# Patient Record
Sex: MALE | Race: WHITE | HISPANIC OR LATINO | URBAN - METROPOLITAN AREA
[De-identification: names, ages, dates, MRNs, and addresses within clinical notes are randomized per-mention and may not be internally consistent; named-entity substitution may affect disease eponyms.]

---

## 2023-11-01 ENCOUNTER — INPATIENT (INPATIENT)
Facility: HOSPITAL | Age: 38
LOS: 5 days | Discharge: HOME | DRG: 603 | End: 2023-11-07
Attending: INTERNAL MEDICINE | Admitting: INTERNAL MEDICINE
Payer: MEDICAID

## 2023-11-01 VITALS
DIASTOLIC BLOOD PRESSURE: 80 MMHG | HEART RATE: 82 BPM | HEIGHT: 62.2 IN | WEIGHT: 171.08 LBS | SYSTOLIC BLOOD PRESSURE: 142 MMHG | OXYGEN SATURATION: 99 % | TEMPERATURE: 99 F | RESPIRATION RATE: 20 BRPM

## 2023-11-01 LAB
ALBUMIN SERPL ELPH-MCNC: 4.1 G/DL — SIGNIFICANT CHANGE UP (ref 3.3–5.2)
ALBUMIN SERPL ELPH-MCNC: 4.1 G/DL — SIGNIFICANT CHANGE UP (ref 3.3–5.2)
ALP SERPL-CCNC: 168 U/L — HIGH (ref 40–120)
ALP SERPL-CCNC: 168 U/L — HIGH (ref 40–120)
ALT FLD-CCNC: 50 U/L — HIGH
ALT FLD-CCNC: 50 U/L — HIGH
ANION GAP SERPL CALC-SCNC: 11 MMOL/L — SIGNIFICANT CHANGE UP (ref 5–17)
ANION GAP SERPL CALC-SCNC: 11 MMOL/L — SIGNIFICANT CHANGE UP (ref 5–17)
AST SERPL-CCNC: 29 U/L — SIGNIFICANT CHANGE UP
AST SERPL-CCNC: 29 U/L — SIGNIFICANT CHANGE UP
BASOPHILS # BLD AUTO: 0.05 K/UL — SIGNIFICANT CHANGE UP (ref 0–0.2)
BASOPHILS # BLD AUTO: 0.05 K/UL — SIGNIFICANT CHANGE UP (ref 0–0.2)
BASOPHILS NFR BLD AUTO: 0.7 % — SIGNIFICANT CHANGE UP (ref 0–2)
BASOPHILS NFR BLD AUTO: 0.7 % — SIGNIFICANT CHANGE UP (ref 0–2)
BILIRUB SERPL-MCNC: 0.3 MG/DL — LOW (ref 0.4–2)
BILIRUB SERPL-MCNC: 0.3 MG/DL — LOW (ref 0.4–2)
BUN SERPL-MCNC: 9.4 MG/DL — SIGNIFICANT CHANGE UP (ref 8–20)
BUN SERPL-MCNC: 9.4 MG/DL — SIGNIFICANT CHANGE UP (ref 8–20)
CALCIUM SERPL-MCNC: 9.3 MG/DL — SIGNIFICANT CHANGE UP (ref 8.4–10.5)
CALCIUM SERPL-MCNC: 9.3 MG/DL — SIGNIFICANT CHANGE UP (ref 8.4–10.5)
CHLORIDE SERPL-SCNC: 99 MMOL/L — SIGNIFICANT CHANGE UP (ref 96–108)
CHLORIDE SERPL-SCNC: 99 MMOL/L — SIGNIFICANT CHANGE UP (ref 96–108)
CO2 SERPL-SCNC: 28 MMOL/L — SIGNIFICANT CHANGE UP (ref 22–29)
CO2 SERPL-SCNC: 28 MMOL/L — SIGNIFICANT CHANGE UP (ref 22–29)
CREAT SERPL-MCNC: 0.61 MG/DL — SIGNIFICANT CHANGE UP (ref 0.5–1.3)
CREAT SERPL-MCNC: 0.61 MG/DL — SIGNIFICANT CHANGE UP (ref 0.5–1.3)
EGFR: 125 ML/MIN/1.73M2 — SIGNIFICANT CHANGE UP
EGFR: 125 ML/MIN/1.73M2 — SIGNIFICANT CHANGE UP
EOSINOPHIL # BLD AUTO: 0.08 K/UL — SIGNIFICANT CHANGE UP (ref 0–0.5)
EOSINOPHIL # BLD AUTO: 0.08 K/UL — SIGNIFICANT CHANGE UP (ref 0–0.5)
EOSINOPHIL NFR BLD AUTO: 1.2 % — SIGNIFICANT CHANGE UP (ref 0–6)
EOSINOPHIL NFR BLD AUTO: 1.2 % — SIGNIFICANT CHANGE UP (ref 0–6)
GLUCOSE SERPL-MCNC: 415 MG/DL — HIGH (ref 70–99)
GLUCOSE SERPL-MCNC: 415 MG/DL — HIGH (ref 70–99)
HCT VFR BLD CALC: 40.3 % — SIGNIFICANT CHANGE UP (ref 39–50)
HCT VFR BLD CALC: 40.3 % — SIGNIFICANT CHANGE UP (ref 39–50)
HGB BLD-MCNC: 14.3 G/DL — SIGNIFICANT CHANGE UP (ref 13–17)
HGB BLD-MCNC: 14.3 G/DL — SIGNIFICANT CHANGE UP (ref 13–17)
IMM GRANULOCYTES NFR BLD AUTO: 0.3 % — SIGNIFICANT CHANGE UP (ref 0–0.9)
IMM GRANULOCYTES NFR BLD AUTO: 0.3 % — SIGNIFICANT CHANGE UP (ref 0–0.9)
LYMPHOCYTES # BLD AUTO: 2.13 K/UL — SIGNIFICANT CHANGE UP (ref 1–3.3)
LYMPHOCYTES # BLD AUTO: 2.13 K/UL — SIGNIFICANT CHANGE UP (ref 1–3.3)
LYMPHOCYTES # BLD AUTO: 31.1 % — SIGNIFICANT CHANGE UP (ref 13–44)
LYMPHOCYTES # BLD AUTO: 31.1 % — SIGNIFICANT CHANGE UP (ref 13–44)
MCHC RBC-ENTMCNC: 31.2 PG — SIGNIFICANT CHANGE UP (ref 27–34)
MCHC RBC-ENTMCNC: 31.2 PG — SIGNIFICANT CHANGE UP (ref 27–34)
MCHC RBC-ENTMCNC: 35.5 GM/DL — SIGNIFICANT CHANGE UP (ref 32–36)
MCHC RBC-ENTMCNC: 35.5 GM/DL — SIGNIFICANT CHANGE UP (ref 32–36)
MCV RBC AUTO: 87.8 FL — SIGNIFICANT CHANGE UP (ref 80–100)
MCV RBC AUTO: 87.8 FL — SIGNIFICANT CHANGE UP (ref 80–100)
MONOCYTES # BLD AUTO: 0.62 K/UL — SIGNIFICANT CHANGE UP (ref 0–0.9)
MONOCYTES # BLD AUTO: 0.62 K/UL — SIGNIFICANT CHANGE UP (ref 0–0.9)
MONOCYTES NFR BLD AUTO: 9.1 % — SIGNIFICANT CHANGE UP (ref 2–14)
MONOCYTES NFR BLD AUTO: 9.1 % — SIGNIFICANT CHANGE UP (ref 2–14)
NEUTROPHILS # BLD AUTO: 3.94 K/UL — SIGNIFICANT CHANGE UP (ref 1.8–7.4)
NEUTROPHILS # BLD AUTO: 3.94 K/UL — SIGNIFICANT CHANGE UP (ref 1.8–7.4)
NEUTROPHILS NFR BLD AUTO: 57.6 % — SIGNIFICANT CHANGE UP (ref 43–77)
NEUTROPHILS NFR BLD AUTO: 57.6 % — SIGNIFICANT CHANGE UP (ref 43–77)
PLATELET # BLD AUTO: 318 K/UL — SIGNIFICANT CHANGE UP (ref 150–400)
PLATELET # BLD AUTO: 318 K/UL — SIGNIFICANT CHANGE UP (ref 150–400)
POTASSIUM SERPL-MCNC: 4.8 MMOL/L — SIGNIFICANT CHANGE UP (ref 3.5–5.3)
POTASSIUM SERPL-MCNC: 4.8 MMOL/L — SIGNIFICANT CHANGE UP (ref 3.5–5.3)
POTASSIUM SERPL-SCNC: 4.8 MMOL/L — SIGNIFICANT CHANGE UP (ref 3.5–5.3)
POTASSIUM SERPL-SCNC: 4.8 MMOL/L — SIGNIFICANT CHANGE UP (ref 3.5–5.3)
PROT SERPL-MCNC: 8.1 G/DL — SIGNIFICANT CHANGE UP (ref 6.6–8.7)
PROT SERPL-MCNC: 8.1 G/DL — SIGNIFICANT CHANGE UP (ref 6.6–8.7)
RBC # BLD: 4.59 M/UL — SIGNIFICANT CHANGE UP (ref 4.2–5.8)
RBC # BLD: 4.59 M/UL — SIGNIFICANT CHANGE UP (ref 4.2–5.8)
RBC # FLD: 11.8 % — SIGNIFICANT CHANGE UP (ref 10.3–14.5)
RBC # FLD: 11.8 % — SIGNIFICANT CHANGE UP (ref 10.3–14.5)
SODIUM SERPL-SCNC: 138 MMOL/L — SIGNIFICANT CHANGE UP (ref 135–145)
SODIUM SERPL-SCNC: 138 MMOL/L — SIGNIFICANT CHANGE UP (ref 135–145)
WBC # BLD: 6.84 K/UL — SIGNIFICANT CHANGE UP (ref 3.8–10.5)
WBC # BLD: 6.84 K/UL — SIGNIFICANT CHANGE UP (ref 3.8–10.5)
WBC # FLD AUTO: 6.84 K/UL — SIGNIFICANT CHANGE UP (ref 3.8–10.5)
WBC # FLD AUTO: 6.84 K/UL — SIGNIFICANT CHANGE UP (ref 3.8–10.5)

## 2023-11-01 PROCEDURE — 99285 EMERGENCY DEPT VISIT HI MDM: CPT

## 2023-11-01 RX ORDER — SODIUM CHLORIDE 9 MG/ML
1000 INJECTION INTRAMUSCULAR; INTRAVENOUS; SUBCUTANEOUS ONCE
Refills: 0 | Status: COMPLETED | OUTPATIENT
Start: 2023-11-01 | End: 2023-11-01

## 2023-11-01 RX ADMIN — SODIUM CHLORIDE 1000 MILLILITER(S): 9 INJECTION INTRAMUSCULAR; INTRAVENOUS; SUBCUTANEOUS at 23:25

## 2023-11-01 NOTE — ED PROVIDER NOTE - PHYSICAL EXAMINATION
Well-nourished well-developed male alert and oriented x3 no acute distress HEENT is significant for approximate 4 to 5 cm mass to the left lateral neck.  Mass is mobile.  There is no fluctuance.  It is not tender to palpation.  Patient has no associated skin signs surrounding the mass.  On examination of the oral cavity patient has poor dentition with multiple cavities that have fillings he is missing some teeth.  There is no obvious signs of infection.

## 2023-11-01 NOTE — ED PROVIDER NOTE - NS ED ATTENDING STATEMENT MOD
This was a shared visit with the DELGADO. I reviewed and verified the documentation and independently performed the documented:

## 2023-11-01 NOTE — ED PROVIDER NOTE - ATTENDING APP SHARED VISIT CONTRIBUTION OF CARE
I, Zainab Caro, performed the initial face to face bedside interview with this patient regarding history of present illness, review of symptoms and relevant past medical, social and family history.  I completed an independent physical examination.  I was the initial provider who evaluated this patient. I have signed out the follow up of any pending tests (i.e. labs, radiological studies) to the ACP.  I have communicated the patient’s plan of care and disposition with the ACP.

## 2023-11-01 NOTE — ED PROVIDER NOTE - PROGRESS NOTE DETAILS
Pt received in s/o from night [facility] pending ENT eval. ENT evaluated pt and recommending medicine admit, iv abx for possible abscess and DM management. A1c found to be 12, started on SSI. Blood cultures drawn. Admit to medicine.

## 2023-11-01 NOTE — ED ADULT NURSE NOTE - OBJECTIVE STATEMENT
pt to ED with complaints of facial swelling.  denies pmh. pt states last week he developed swelling on left side of neck. pt states he had a cough and fever and then noticed his neck was swollen. pt denies pain or difficulty swallowing. pt denies current fevers or cough. pt denies any trauma. RR even and unlabored. pt swallowing secretions without difficulty.

## 2023-11-01 NOTE — ED PROVIDER NOTE - OBJECTIVE STATEMENT
Patient is a 38-year-old male who presents to the emergency department with swelling to his left neck.  Patient denies any trauma.  Patient denies any sore throat.  Patient has had a lump in the left side of his neck for 1 week.  Patient states on the first day of having this he did have fever however the fever has subsequently resolved patient has no other associated symptoms.  Patient has no pets at home and has not gotten scratched by any domesticated or an domesticated animal in the recent past.

## 2023-11-02 DIAGNOSIS — R22.1 LOCALIZED SWELLING, MASS AND LUMP, NECK: ICD-10-CM

## 2023-11-02 LAB
A1C WITH ESTIMATED AVERAGE GLUCOSE RESULT: 12 % — HIGH (ref 4–5.6)
A1C WITH ESTIMATED AVERAGE GLUCOSE RESULT: 12 % — HIGH (ref 4–5.6)
CRP SERPL-MCNC: 8 MG/L — HIGH
CRP SERPL-MCNC: 8 MG/L — HIGH
ERYTHROCYTE [SEDIMENTATION RATE] IN BLOOD: 16 MM/HR — HIGH (ref 0–15)
ERYTHROCYTE [SEDIMENTATION RATE] IN BLOOD: 16 MM/HR — HIGH (ref 0–15)
ESTIMATED AVERAGE GLUCOSE: 298 MG/DL — HIGH (ref 68–114)
ESTIMATED AVERAGE GLUCOSE: 298 MG/DL — HIGH (ref 68–114)
GLUCOSE BLDC GLUCOMTR-MCNC: 139 MG/DL — HIGH (ref 70–99)
GLUCOSE BLDC GLUCOMTR-MCNC: 139 MG/DL — HIGH (ref 70–99)
GLUCOSE BLDC GLUCOMTR-MCNC: 224 MG/DL — HIGH (ref 70–99)
GLUCOSE BLDC GLUCOMTR-MCNC: 224 MG/DL — HIGH (ref 70–99)
GLUCOSE BLDC GLUCOMTR-MCNC: 244 MG/DL — HIGH (ref 70–99)
GLUCOSE BLDC GLUCOMTR-MCNC: 244 MG/DL — HIGH (ref 70–99)
HIV 1 & 2 AB SERPL IA.RAPID: SIGNIFICANT CHANGE UP
HIV 1 & 2 AB SERPL IA.RAPID: SIGNIFICANT CHANGE UP

## 2023-11-02 PROCEDURE — 99222 1ST HOSP IP/OBS MODERATE 55: CPT

## 2023-11-02 PROCEDURE — 99223 1ST HOSP IP/OBS HIGH 75: CPT

## 2023-11-02 RX ORDER — AMPICILLIN SODIUM AND SULBACTAM SODIUM 250; 125 MG/ML; MG/ML
3 INJECTION, POWDER, FOR SUSPENSION INTRAMUSCULAR; INTRAVENOUS EVERY 6 HOURS
Refills: 0 | Status: DISCONTINUED | OUTPATIENT
Start: 2023-11-02 | End: 2023-11-03

## 2023-11-02 RX ORDER — INSULIN LISPRO 100/ML
VIAL (ML) SUBCUTANEOUS
Refills: 0 | Status: DISCONTINUED | OUTPATIENT
Start: 2023-11-02 | End: 2023-11-07

## 2023-11-02 RX ORDER — INSULIN LISPRO 100/ML
4 VIAL (ML) SUBCUTANEOUS
Refills: 0 | Status: DISCONTINUED | OUTPATIENT
Start: 2023-11-02 | End: 2023-11-07

## 2023-11-02 RX ORDER — SODIUM CHLORIDE 9 MG/ML
1000 INJECTION, SOLUTION INTRAVENOUS
Refills: 0 | Status: DISCONTINUED | OUTPATIENT
Start: 2023-11-02 | End: 2023-11-07

## 2023-11-02 RX ORDER — DEXTROSE 50 % IN WATER 50 %
25 SYRINGE (ML) INTRAVENOUS ONCE
Refills: 0 | Status: DISCONTINUED | OUTPATIENT
Start: 2023-11-02 | End: 2023-11-07

## 2023-11-02 RX ORDER — INSULIN LISPRO 100/ML
VIAL (ML) SUBCUTANEOUS AT BEDTIME
Refills: 0 | Status: DISCONTINUED | OUTPATIENT
Start: 2023-11-02 | End: 2023-11-07

## 2023-11-02 RX ORDER — SODIUM CHLORIDE 9 MG/ML
1000 INJECTION INTRAMUSCULAR; INTRAVENOUS; SUBCUTANEOUS ONCE
Refills: 0 | Status: COMPLETED | OUTPATIENT
Start: 2023-11-02 | End: 2023-11-02

## 2023-11-02 RX ORDER — ACETAMINOPHEN 500 MG
650 TABLET ORAL EVERY 6 HOURS
Refills: 0 | Status: DISCONTINUED | OUTPATIENT
Start: 2023-11-02 | End: 2023-11-07

## 2023-11-02 RX ORDER — INSULIN GLARGINE 100 [IU]/ML
16 INJECTION, SOLUTION SUBCUTANEOUS AT BEDTIME
Refills: 0 | Status: DISCONTINUED | OUTPATIENT
Start: 2023-11-02 | End: 2023-11-03

## 2023-11-02 RX ORDER — ONDANSETRON 8 MG/1
4 TABLET, FILM COATED ORAL EVERY 8 HOURS
Refills: 0 | Status: DISCONTINUED | OUTPATIENT
Start: 2023-11-02 | End: 2023-11-07

## 2023-11-02 RX ORDER — AMPICILLIN SODIUM AND SULBACTAM SODIUM 250; 125 MG/ML; MG/ML
INJECTION, POWDER, FOR SUSPENSION INTRAMUSCULAR; INTRAVENOUS
Refills: 0 | Status: DISCONTINUED | OUTPATIENT
Start: 2023-11-02 | End: 2023-11-03

## 2023-11-02 RX ORDER — AMPICILLIN SODIUM AND SULBACTAM SODIUM 250; 125 MG/ML; MG/ML
3 INJECTION, POWDER, FOR SUSPENSION INTRAMUSCULAR; INTRAVENOUS ONCE
Refills: 0 | Status: COMPLETED | OUTPATIENT
Start: 2023-11-02 | End: 2023-11-02

## 2023-11-02 RX ORDER — DEXTROSE 50 % IN WATER 50 %
15 SYRINGE (ML) INTRAVENOUS ONCE
Refills: 0 | Status: DISCONTINUED | OUTPATIENT
Start: 2023-11-02 | End: 2023-11-07

## 2023-11-02 RX ORDER — LANOLIN ALCOHOL/MO/W.PET/CERES
3 CREAM (GRAM) TOPICAL AT BEDTIME
Refills: 0 | Status: DISCONTINUED | OUTPATIENT
Start: 2023-11-02 | End: 2023-11-07

## 2023-11-02 RX ORDER — DEXTROSE 50 % IN WATER 50 %
12.5 SYRINGE (ML) INTRAVENOUS ONCE
Refills: 0 | Status: DISCONTINUED | OUTPATIENT
Start: 2023-11-02 | End: 2023-11-07

## 2023-11-02 RX ORDER — GLUCAGON INJECTION, SOLUTION 0.5 MG/.1ML
1 INJECTION, SOLUTION SUBCUTANEOUS ONCE
Refills: 0 | Status: DISCONTINUED | OUTPATIENT
Start: 2023-11-02 | End: 2023-11-07

## 2023-11-02 RX ADMIN — SODIUM CHLORIDE 1000 MILLILITER(S): 9 INJECTION INTRAMUSCULAR; INTRAVENOUS; SUBCUTANEOUS at 00:30

## 2023-11-02 RX ADMIN — INSULIN GLARGINE 16 UNIT(S): 100 INJECTION, SOLUTION SUBCUTANEOUS at 21:58

## 2023-11-02 RX ADMIN — SODIUM CHLORIDE 1000 MILLILITER(S): 9 INJECTION INTRAMUSCULAR; INTRAVENOUS; SUBCUTANEOUS at 03:54

## 2023-11-02 RX ADMIN — AMPICILLIN SODIUM AND SULBACTAM SODIUM 200 GRAM(S): 250; 125 INJECTION, POWDER, FOR SUSPENSION INTRAMUSCULAR; INTRAVENOUS at 18:21

## 2023-11-02 RX ADMIN — AMPICILLIN SODIUM AND SULBACTAM SODIUM 200 GRAM(S): 250; 125 INJECTION, POWDER, FOR SUSPENSION INTRAMUSCULAR; INTRAVENOUS at 11:34

## 2023-11-02 RX ADMIN — SODIUM CHLORIDE 1000 MILLILITER(S): 9 INJECTION INTRAMUSCULAR; INTRAVENOUS; SUBCUTANEOUS at 04:30

## 2023-11-02 RX ADMIN — Medication 4: at 14:05

## 2023-11-02 RX ADMIN — Medication 4: at 18:21

## 2023-11-02 RX ADMIN — Medication 4 UNIT(S): at 18:22

## 2023-11-02 NOTE — PATIENT PROFILE ADULT - FALL HARM RISK - UNIVERSAL INTERVENTIONS
Bed in lowest position, wheels locked, appropriate side rails in place/Call bell, personal items and telephone in reach/Instruct patient to call for assistance before getting out of bed or chair/Non-slip footwear when patient is out of bed/[place] to call system/Physically safe environment - no spills, clutter or unnecessary equipment/Purposeful Proactive Rounding/Room/bathroom lighting operational, light cord in reach

## 2023-11-02 NOTE — CONSULT NOTE ADULT - SUBJECTIVE AND OBJECTIVE BOX
HPI: 38 year male presented to hospital because of several week history of enlarging neck mass, and incidentally found to have diabetes  REcent polydipsia and polyuria. No piror h/o of diabetes in patient, although there is a family history of diabetes  PMH otherwise negative          MEDICATIONS  (STANDING):  ampicillin/sulbactam  IVPB 3 Gram(s) IV Intermittent every 6 hours  ampicillin/sulbactam  IVPB      dextrose 5%. 1000 milliLiter(s) (100 mL/Hr) IV Continuous <Continuous>  dextrose 5%. 1000 milliLiter(s) (50 mL/Hr) IV Continuous <Continuous>  dextrose 50% Injectable 12.5 Gram(s) IV Push once  dextrose 50% Injectable 25 Gram(s) IV Push once  dextrose 50% Injectable 25 Gram(s) IV Push once  glucagon  Injectable 1 milliGRAM(s) IntraMuscular once  insulin lispro (ADMELOG) corrective regimen sliding scale   SubCutaneous three times a day before meals  insulin lispro (ADMELOG) corrective regimen sliding scale   SubCutaneous at bedtime    MEDICATIONS  (PRN):  acetaminophen     Tablet .. 650 milliGRAM(s) Oral every 6 hours PRN Temp greater or equal to 38C (100.4F), Mild Pain (1 - 3)  aluminum hydroxide/magnesium hydroxide/simethicone Suspension 30 milliLiter(s) Oral every 4 hours PRN Dyspepsia  dextrose Oral Gel 15 Gram(s) Oral once PRN Blood Glucose LESS THAN 70 milliGRAM(s)/deciliter  melatonin 3 milliGRAM(s) Oral at bedtime PRN Insomnia  ondansetron Injectable 4 milliGRAM(s) IV Push every 8 hours PRN Nausea and/or Vomiting      Allergies    No Known Allergies    Intolerances          PHYSICAL EXAM:    Vital Signs Last 24 Hrs  T(C): 36.8 (02 Nov 2023 07:51), Max: 37.2 (01 Nov 2023 18:02)  T(F): 98.2 (02 Nov 2023 07:51), Max: 99 (01 Nov 2023 18:02)  HR: 60 (02 Nov 2023 07:51) (58 - 82)  BP: 112/66 (02 Nov 2023 07:51) (109/66 - 142/80)  BP(mean): --  RR: 18 (02 Nov 2023 07:51) (18 - 20)  SpO2: 98% (02 Nov 2023 07:51) (96% - 99%)    Parameters below as of 01 Nov 2023 18:02  Patient On (Oxygen Delivery Method): room air        General appearance: Well developed, well nourished. NAD    Neck: Trachea midline. No thyroid enlargement. Prominent left neck mass, not tender or fluctuant    Lungs: Normal respiratory excursion. Lungs clear.    CV: Regular cardiac rhythm. No murmur.    Neuro: Cranial nerves intact.     Psych: Normal affect            LABS:                        14.3   6.84  )-----------( 318      ( 01 Nov 2023 20:00 )             40.3     11-01    138  |  99  |  9.4  ----------------------------<  415<H>  4.8   |  28.0  |  0.61    Ca    9.3      01 Nov 2023 20:00    TPro  8.1  /  Alb  4.1  /  TBili  0.3<L>  /  DBili  x   /  AST  29  /  ALT  50<H>  /  AlkPhos  168<H>  11-01    Urinalysis Basic - ( 01 Nov 2023 20:00 )    Color: x / Appearance: x / SG: x / pH: x  Gluc: 415 mg/dL / Ketone: x  / Bili: x / Urobili: x   Blood: x / Protein: x / Nitrite: x   Leuk Esterase: x / RBC: x / WBC x   Sq Epi: x / Non Sq Epi: x / Bacteria: x      LIVER FUNCTIONS - ( 01 Nov 2023 20:00 )  Alb: 4.1 g/dL / Pro: 8.1 g/dL / ALK PHOS: 168 U/L / ALT: 50 U/L / AST: 29 U/L / GGT: x                 POCT Blood Glucose.: 250 mg/dL (11-02-23 @ 10:50)  A1C 12

## 2023-11-02 NOTE — H&P ADULT - NSHPLABSRESULTS_GEN_ALL_CORE
CT Neck w/IV con  IMPRESSION:  5.0 cm cystic-solid mass identified in the deep left neck with regional inflammatory changes described in detail above; diagnostic considerations   include infection (infected branchial cleft cyst, lymphadenitis/scrofula) vs. necrotic node/neoplasm. Tissue sampling recommended.

## 2023-11-02 NOTE — H&P ADULT - HISTORY OF PRESENT ILLNESS
38 year old male with no prior PMH presents from home with 1 week increasing neck mass. States that approx 1 week ago he started noticing some swelling under his jaw after having localized pain there. Reports that i has grown in size since then which is why he decided on coming to the hospital, In the ER imaging done and ENT consulted for a 5 cm mass.    He denies any recent travel history, Born [facility], moved to [place] ~17 years ago), no recent sick contacts (ioncluding TB), no fevers, chills, night sweats or unintentional weight loss. Does repots a mild decrease in appetite for ~ 6 months.  38 year old male with no prior PMH presents from home with 1 week increasing neck mass. States that approx 1 week ago he started noticing some swelling under his jaw after having localized pain there. Reports that i has grown in size since then which is why he decided on coming to the hospital, In the ER imaging done and ENT consulted for a 5 cm mass.    He denies any recent travel history, Born [facility], moved to [place] ~17 years ago), no recent sick contacts (ioncluding TB), no fevers, chills, night sweats or unintentional weight loss. Does repots a mild decrease in appetite for ~ 6 months. No recent dental work  38 year old male with no prior PMH presents from home with 1 week increasing neck mass. States that approx 1 week ago he started noticing some swelling under his jaw after having localized pain there. Reports that i has grown in size since then which is why he decided on coming to the hospital, In the ER imaging done and ENT consulted for a 5 cm mass.    He denies any recent travel history, Born [facility], moved to [place] ~17 years ago), no recent sick contacts (including TB), no fevers, chills, night sweats or unintentional weight loss. Does repots a mild decrease in appetite for ~ 6 months. No recent dental work

## 2023-11-02 NOTE — H&P ADULT - NSHPPHYSICALEXAM_GEN_ALL_CORE
Gen : Non toxic appearing, comfortable, NAD  HEENT : NCAT, MMM, left, non tender firm ~7 cm swelling deep to platysma (no bruit), no JVD  CVS : S1S2, regular rate and rhythm, no murmurs  Resp : CTA b/l, no rhonchi or wheezes appreciated   Abd : Soft, non distended, non tender, BS +ve   MSK : No joint swelling or tenderness, no digital clubbing, no distal cyanosis  Neuro : CN II-XII intact, no focal motor or sensory deficits   Psych : Pleasant, no anxiety, normal affect Gen : Non toxic appearing, comfortable, NAD  HEENT : NCAT, MMM, left, non tender firm ~7 cm swelling deep to platysma (no bruit), no JVD, left molar fillings   CVS : S1S2, regular rate and rhythm, no murmurs  Resp : CTA b/l, no rhonchi or wheezes appreciated   Abd : Soft, non distended, non tender, BS +ve   MSK : No joint swelling or tenderness, no digital clubbing, no distal cyanosis  Neuro : CN II-XII intact, no focal motor or sensory deficits   Psych : Pleasant, no anxiety, normal affect

## 2023-11-02 NOTE — CONSULT NOTE ADULT - ASSESSMENT
38M presenting with L neck mass x 1 week. 38M presenting with L neck mass x 1 week, also found to have uncontrolled diabetes.

## 2023-11-02 NOTE — CONSULT NOTE ADULT - SUBJECTIVE AND OBJECTIVE BOX
CC: L neck mass    HPI: Patient is a 38M with no significant PMH who presents with L neck mass x 1 week. He reports that it started slowly growing, and was accompanied by fevers and tooth pain which has since subsided. He denies SOB, dysphagia, odynophagia or voice changes. He denies sick contacts.      PAST MEDICAL & SURGICAL HISTORY:    Allergies    No Known Allergies    Intolerances      MEDICATIONS  (STANDING):    MEDICATIONS  (PRN):      Social History: **??**    Family history: **??**    ROS:   ENT: all negative except as noted in HPI   CV: denies palpitations  Pulm: denies SOB, cough, hemoptysis  GI: denies change in apetite, indigestion, n/v  : denies pertinent urinary symptoms, urgency  Neuro: denies numbness/tingling, loss of sensation  Psych: denies anxiety  MS: denies muscle weakness, instability  Heme: denies easy bruising or bleeding  Endo: denies heat/cold intolerance, excessive sweating  Vascular: denies LE edema    Vital Signs Last 24 Hrs  T(C): 36.8 (02 Nov 2023 07:51), Max: 37.2 (01 Nov 2023 18:02)  T(F): 98.2 (02 Nov 2023 07:51), Max: 99 (01 Nov 2023 18:02)  HR: 60 (02 Nov 2023 07:51) (58 - 82)  BP: 112/66 (02 Nov 2023 07:51) (109/66 - 142/80)  BP(mean): --  RR: 18 (02 Nov 2023 07:51) (18 - 20)  SpO2: 98% (02 Nov 2023 07:51) (96% - 99%)    Parameters below as of 01 Nov 2023 18:02  Patient On (Oxygen Delivery Method): room air                              14.3   6.84  )-----------( 318      ( 01 Nov 2023 20:00 )             40.3    11-01    138  |  99  |  9.4  ----------------------------<  415<H>  4.8   |  28.0  |  0.61    Ca    9.3      01 Nov 2023 20:00    TPro  8.1  /  Alb  4.1  /  TBili  0.3<L>  /  DBili  x   /  AST  29  /  ALT  50<H>  /  AlkPhos  168<H>  11-01       PHYSICAL EXAM:  Gen: NAD  Skin: No rashes, bruises, or lesions  Head: Normocephalic, Atraumatic  Face: no edema, erythema, or fluctuance. Parotid glands soft without mass  Eyes: no scleral injection  Ears: Right: ear canal clear, TM intact without effusion or erythema. No evidence of any fluid drainage. No mastoid tenderness, erythema, or ear bulging            Left: ear canal clear, TM intact without effusion or erythema. No evidence of any fluid drainage. No mastoid tenderness, erythema, or ear bulging  Nose: Nares bilaterally patent, no discharge  Mouth: No Stridor / Drooling / Trismus.  Mucosa moist, tongue/uvula midline, oropharynx clear  Neck: Flat, supple, no lymphadenopathy, trachea midline, no masses  Lymphatic: No lymphadenopathy  Resp: breathing easily, no stridor  CV: no peripheral edema/cyanosis  GI: nondistended   Peripheral vascular: no JVD or edema  Neuro: facial nerve intact, no facial droop      Diagnostic Nasal Endoscopy: (Scope #2 used)    Fiberoptic Indirect laryngoscopy:  (Scope #2 used)    IMAGING/ADDITIONAL STUDIES:  INCOMPLETE NOTE    CC: L neck mass    HPI: Patient is a 38M with no significant PMH who presents with L neck mass x 1 week. He reports that it started slowly growing, and was accompanied by fevers and tooth pain which has since subsided. He denies SOB, dysphagia, odynophagia or voice changes. He denies sick contacts. No hx of smoking or alcohol use. At bedside, comfortable and in NAD. He denies pain, tenderness or discharge from site. Vitals and WBC WNL. Of note, glucose 415.       PAST MEDICAL & SURGICAL HISTORY:    Allergies    No Known Allergies    Intolerances      MEDICATIONS  (STANDING):    MEDICATIONS  (PRN):      ROS:   ENT: all negative except as noted in HPI   CV: denies palpitations  Pulm: denies SOB, cough, hemoptysis  GI: denies change in apetite, indigestion, n/v  : denies pertinent urinary symptoms, urgency  Neuro: denies numbness/tingling, loss of sensation  Psych: denies anxiety  MS: denies muscle weakness, instability  Heme: denies easy bruising or bleeding  Endo: denies heat/cold intolerance, excessive sweating  Vascular: denies LE edema    Vital Signs Last 24 Hrs  T(C): 36.8 (02 Nov 2023 07:51), Max: 37.2 (01 Nov 2023 18:02)  T(F): 98.2 (02 Nov 2023 07:51), Max: 99 (01 Nov 2023 18:02)  HR: 60 (02 Nov 2023 07:51) (58 - 82)  BP: 112/66 (02 Nov 2023 07:51) (109/66 - 142/80)  BP(mean): --  RR: 18 (02 Nov 2023 07:51) (18 - 20)  SpO2: 98% (02 Nov 2023 07:51) (96% - 99%)    Parameters below as of 01 Nov 2023 18:02  Patient On (Oxygen Delivery Method): room air                              14.3   6.84  )-----------( 318      ( 01 Nov 2023 20:00 )             40.3    11-01    138  |  99  |  9.4  ----------------------------<  415<H>  4.8   |  28.0  |  0.61    Ca    9.3      01 Nov 2023 20:00    TPro  8.1  /  Alb  4.1  /  TBili  0.3<L>  /  DBili  x   /  AST  29  /  ALT  50<H>  /  AlkPhos  168<H>  11-01       PHYSICAL EXAM:  Gen: NAD  Skin: No rashes, bruises, or lesions  Head: Normocephalic, Atraumatic  Face: no edema, erythema, or fluctuance. Parotid glands soft without mass  Eyes: no scleral injection  Ears: external exam unremarkable  Nose: Nares bilaterally patent, no discharge  Mouth: No Stridor / Drooling / Trismus.  Mucosa moist, tongue/uvula midline, oropharynx clear  Neck: firm 5cm palpable and mobile mass, no erythema, fluctuance or discharge noted  Lymphatic: +cervical lymphadenopathy  Resp: breathing easily, no stridor  Neuro: facial nerve intact, no facial droop      IMAGING/ADDITIONAL STUDIES:   ACC: [id number] EXAM:  CT NECK SOFT TISSUE IC   ORDERED BY: LAUREN CASTILLO     PROCEDURE DATE:  11/01/2023          INTERPRETATION:  CT NECK WITH CONTRAST    INDICATION: Left neck mass    TECHNIQUE: Multiple contiguous axial CT images were obtained of the neck   following the administration of 80 cc Omnipaque 350 intravenous contrast.   20 cc contrast discarded. Coronal and sagittal reformatted images were   obtained.    COMPARISON: None    FINDINGS:    NASOPHARYNX / SKULL BASE: There is a cystic-solid mass identified   measuring 3.0 x 3.5 x 5.0 cm extending inferiorly from the   jugulodigastric station along the course of the sternocleidomastoid   muscle, with surrounding inflammatory changes involving the muscle belly,   platysma, and submandibular soft tissues.    SUPRAHYOID NECK: The oropharynx is normal. The oral cavity is normal. The   parotid glands are unremarkable. The submandibular glands are   unremarkable.    INFRAHYOID NECK: The hypopharynx is unremarkable. The larynx and proximal   subglottic airway are unremarkable.    THYROID GLAND: The thyroid is homogenous in appearance without focal   nodule or mass.    LYMPH NODES: Additional reactive appearing cervical lymphadenopathy.    LUNG APICES / VASCULAR STRUCTURES: The imaged portions of the lung apices   are unremarkable. Vascular structures in the neck are normal.    PARANASAL SINUSES: The paranasal sinuses demonstrate left maxillary   atelectasis.    IMAGED SPINE: Imaged spine is normal.    ADDITIONAL OBSERVATIONS: The bones are unremarkable. No intracranial   pathology is evident.      IMPRESSION:  5.0 cm cystic-solid mass identified in the deep left neck with regional   inflammatory changes described in detail above; diagnostic considerations   include infection (infected branchial cleft cyst, lymphadenitis/scrofula)   vs. necrotic node/neoplasm. Tissue sampling recommended.    --- End of Report ---   CC: L neck mass    HPI: Patient is a 38M with no significant PMH who presents with L neck mass x 1 week. He reports that it started slowly growing, and was accompanied by fevers and tooth pain which has since subsided. He denies SOB, dysphagia, odynophagia or voice changes. He denies sick contacts. No hx of smoking or alcohol use. At bedside, comfortable and in NAD. He denies pain, tenderness or discharge from site. Vitals and WBC WNL. Of note, glucose 415.       PAST MEDICAL & SURGICAL HISTORY:    Allergies    No Known Allergies    Intolerances      MEDICATIONS  (STANDING):    MEDICATIONS  (PRN):      ROS:   ENT: all negative except as noted in HPI   CV: denies palpitations  Pulm: denies SOB, cough, hemoptysis  GI: denies change in apetite, indigestion, n/v  : denies pertinent urinary symptoms, urgency  Neuro: denies numbness/tingling, loss of sensation  Psych: denies anxiety  MS: denies muscle weakness, instability  Heme: denies easy bruising or bleeding  Endo: denies heat/cold intolerance, excessive sweating  Vascular: denies LE edema    Vital Signs Last 24 Hrs  T(C): 36.8 (02 Nov 2023 07:51), Max: 37.2 (01 Nov 2023 18:02)  T(F): 98.2 (02 Nov 2023 07:51), Max: 99 (01 Nov 2023 18:02)  HR: 60 (02 Nov 2023 07:51) (58 - 82)  BP: 112/66 (02 Nov 2023 07:51) (109/66 - 142/80)  BP(mean): --  RR: 18 (02 Nov 2023 07:51) (18 - 20)  SpO2: 98% (02 Nov 2023 07:51) (96% - 99%)    Parameters below as of 01 Nov 2023 18:02  Patient On (Oxygen Delivery Method): room air                              14.3   6.84  )-----------( 318      ( 01 Nov 2023 20:00 )             40.3    11-01    138  |  99  |  9.4  ----------------------------<  415<H>  4.8   |  28.0  |  0.61    Ca    9.3      01 Nov 2023 20:00    TPro  8.1  /  Alb  4.1  /  TBili  0.3<L>  /  DBili  x   /  AST  29  /  ALT  50<H>  /  AlkPhos  168<H>  11-01       PHYSICAL EXAM:  Gen: NAD  Skin: No rashes, bruises, or lesions  Head: Normocephalic, Atraumatic  Face: no edema, erythema, or fluctuance. Parotid glands soft without mass  Eyes: no scleral injection  Ears: external exam unremarkable  Nose: Nares bilaterally patent, no discharge  Mouth: No Stridor / Drooling / Trismus.  Mucosa moist, tongue/uvula midline, oropharynx clear  Neck: firm 5cm palpable and mobile mass, no erythema, fluctuance or discharge noted  Lymphatic: +cervical lymphadenopathy  Resp: breathing easily, no stridor  Neuro: facial nerve intact, no facial droop      IMAGING/ADDITIONAL STUDIES:   ACC: [id number] EXAM:  CT NECK SOFT TISSUE IC   ORDERED BY: LAUREN CASTILLO     PROCEDURE DATE:  11/01/2023          INTERPRETATION:  CT NECK WITH CONTRAST    INDICATION: Left neck mass    TECHNIQUE: Multiple contiguous axial CT images were obtained of the neck   following the administration of 80 cc Omnipaque 350 intravenous contrast.   20 cc contrast discarded. Coronal and sagittal reformatted images were   obtained.    COMPARISON: None    FINDINGS:    NASOPHARYNX / SKULL BASE: There is a cystic-solid mass identified   measuring 3.0 x 3.5 x 5.0 cm extending inferiorly from the   jugulodigastric station along the course of the sternocleidomastoid   muscle, with surrounding inflammatory changes involving the muscle belly,   platysma, and submandibular soft tissues.    SUPRAHYOID NECK: The oropharynx is normal. The oral cavity is normal. The   parotid glands are unremarkable. The submandibular glands are   unremarkable.    INFRAHYOID NECK: The hypopharynx is unremarkable. The larynx and proximal   subglottic airway are unremarkable.    THYROID GLAND: The thyroid is homogenous in appearance without focal   nodule or mass.    LYMPH NODES: Additional reactive appearing cervical lymphadenopathy.    LUNG APICES / VASCULAR STRUCTURES: The imaged portions of the lung apices   are unremarkable. Vascular structures in the neck are normal.    PARANASAL SINUSES: The paranasal sinuses demonstrate left maxillary   atelectasis.    IMAGED SPINE: Imaged spine is normal.    ADDITIONAL OBSERVATIONS: The bones are unremarkable. No intracranial   pathology is evident.      IMPRESSION:  5.0 cm cystic-solid mass identified in the deep left neck with regional   inflammatory changes described in detail above; diagnostic considerations   include infection (infected branchial cleft cyst, lymphadenitis/scrofula)   vs. necrotic node/neoplasm. Tissue sampling recommended.    --- End of Report ---    Fiberoptic Indirect Laryngoscopy (Ambu scope used):    Reason for Laryngoscopy:    Patient was unable to cooperate with mirror.  Nasopharynx, oropharynx, and hypopharynx clear, no bleeding. Asymmetric tonsillar tissue, L>R. Tongue base, posterior pharyngeal wall, vallecula, epiglottis, and subglottis appear normal. No erythema, edema, pooling of secretions, masses or lesions. Airway patent, no foreign body visualized. No glottic/supraglottic edema. True vocal cords, arytenoids, vestibular folds, ventricles, pyriform sinuses, and aryepiglottic folds appear normal bilaterally. Vocal cords mobile with good contact b/l.

## 2023-11-02 NOTE — H&P ADULT - ASSESSMENT
38 year old male with no prior known PMH now presents with increasing neck mass x 1 week. Found to be diabetic.     Admit to Medicine to any bed  Enlarging neck mass     38 year old male with no prior known PMH now presenting with increasing neck mass x 1 week. Incidentally found to have uncontrolled DM. Seen by ENT,    Left neck mass   Broad differential   Scrofula vs bacterial abscess, infected lymph node vs   Rate of growth (per history) argues less in favor of being neoplastic however will  lesion less likley  Scrofula, bacterial, 0      Found to be diabetic.     Admit to Medicine to any bed  Enlarging neck mass     38 year old male with no prior known PMH now presenting with increasing neck mass x 1 week. Incidentally found to have uncontrolled DM. Seen by ENT,    Left neck mass   Broad differential   Scrofula vs bacterial abscess vs neoplastic vs infected brachial cleft vs atypical mycobacterial   Seen by ENT.   No warmth / erythema   Started on Unasyn. Now that started can continue   Will need sampling  Blood cultures sent   Send ESR/CRP now  Send mycobacterial culture   ID consulted    Newly diagnosed DM   A1C 12  Appreciate Endo eval  Started on Lantus 16 unts QHS and Lispro premeal 4 units tid     DVT ppx : Heparin s/c    38 year old male with no prior known PMH now presenting with increasing neck mass x 1 week. Incidentally found to have uncontrolled DM. Seen by ENT,    Left neck mass   Broad differential   Scrofula vs bacterial abscess vs neoplastic vs infected brachial cleft vs atypical mycobacterial   Seen by ENT.   No warmth / erythema   Started on Unasyn. Now that started can continue   Will need sampling  Blood cultures sent   Send ESR/CRP now  Send mycobacterial culture   Send Quant  ID consulted    Newly diagnosed DM   A1C 12  Appreciate Endo eval  Started on Lantus 16 unts QHS and Lispro premeal 4 units tid     DVT ppx : Heparin s/c    38 year old male with no prior known PMH now presenting with increasing neck mass x 1 week. Incidentally found to have uncontrolled DM. Seen by ENT,    Left neck mass   Broad differential   Bacterial abscess vs neoplastic vs infected brachial cleft vs atypical mycobacterial vs Scrofula vs  Seen by ENT.   No warmth / erythema   Started on Unasyn. Will awiat ID eval prior to determining if should continue vs stop (given lack of systenmc s;/s of infection and chances of affecting culture sampling)  Images reviewed with IR. Potential biopsy tomorrow. Will not need anesthesia, can continue PO   Will need sampling  Blood cultures sent   Send ESR/CRP now  Send mycobacterial culture   Send Quant  ID consulted    Newly diagnosed DM   A1C 12  Appreciate Endo eval  Started on Lantus 16 unts QHS and Lispro premeal 4 units tid     DVT ppx : Heparin s/c    38 year old male with no prior known PMH now presenting with increasing neck mass x 1 week. Incidentally found to have uncontrolled DM. Seen by ENT,    Left neck mass   Broad differential   Bacterial abscess vs neoplastic vs infected brachial cleft vs atypical mycobacterial vs Scrofula vs  Seen by ENT.   No warmth / erythema   Started on Unasyn. Will awiat ID eval prior to determining if should continue vs stop (given lack of systenmc s;/s of infection and chances of affecting culture sampling)  Images reviewed with IR. Potential biopsy tomorrow. Will not need anesthesia, can continue PO   Will need sampling  Blood cultures sent   Send ESR/CRP now  Send mycobacterial culture   Send Quant  ID consulted    Newly diagnosed DM   A1C 12  Appreciate Endo eval  Started on Lantus 16 unts QHS and Lispro premeal 4 units tid     DVT ppx : SCDs

## 2023-11-02 NOTE — ADVANCED PRACTICE NURSE CONSULT - ASSESSMENT
went to see pt and family in am pt is a+ox3 co 0 pain family at bedside providing comfort. he did not know that he has diabetes. pt was educated about the elevated bg and the increased risk of infection. he was educated in [facility] about the importance of using insulin at this time. he was educated about the type of insulin he is on written material in Faroese provided. he was educated about the use of insulin pen written material about insulin pen provided. pt and family were educated about healty food choices the plate method was used to teach pprtions. written material provided. pt and family were educated about ss of hypoglycemia and treatment written material provided.pt was invited to [facility] diabetes club on november 5 2023 yearly calendar provided

## 2023-11-02 NOTE — CONSULT NOTE ADULT - SUBJECTIVE AND OBJECTIVE BOX
INFECTIOUS DISEASES AND INTERNAL MEDICINE at [facility]  =======================================================  Zelalem Almodovar MD  Diplomates American Board of Internal Medicine and Infectious Diseases  Telephone [phone]  Fax            [phone]  =======================================================    JOELLE [id number]      HPI:  38 year old male with no prior PMH presents from home with 1 week increasing neck mass. States that approx 1 week ago he started noticing some swelling under his jaw after having localized pain there. Reports that i has grown in size since then which is why he decided on coming to the hospital, In the ER imaging done and ENT consulted for a 5 cm mass.    He denies any recent travel history, Born [facility], moved to [place] ago), no recent sick contacts (including TB), no fevers, chills, night sweats or unintentional weight loss. Does repots a mild decrease in appetite for ~ 6 months. No recent dental work     AS ABOVE WITH NECK MASS NON TOXIC  AFEBRILE CT SCAN ? INFECTIOUS PROCESS  ASKED TO EVALUATE FROM ID STANDPOINT       PAST MEDICAL & SURGICAL HISTORY:      ANTIBIOTICS  ampicillin/sulbactam  IVPB 3 Gram(s) IV Intermittent every 6 hours  ampicillin/sulbactam  IVPB          Allergies    No Known Allergies    Intolerances        SOCIAL HISTORY:     FAMILY HX   FAMILY HISTORY:      Vital Signs Last 24 Hrs  T(C): 36.6 (02 Nov 2023 15:36), Max: 37.2 (01 Nov 2023 18:02)  T(F): 97.8 (02 Nov 2023 15:36), Max: 99 (01 Nov 2023 18:02)  HR: 74 (02 Nov 2023 15:36) (58 - 82)  BP: 121/71 (02 Nov 2023 15:36) (109/66 - 142/80)  BP(mean): --  RR: 18 (02 Nov 2023 15:36) (18 - 20)  SpO2: 94% (02 Nov 2023 15:36) (94% - 99%)    Parameters below as of 02 Nov 2023 15:36  Patient On (Oxygen Delivery Method): room air      Drug Dosing Weight  Height (cm): 158 (01 Nov 2023 18:02)  Weight (kg): 77.6 (01 Nov 2023 18:02)  BMI (kg/m2): 31.1 (01 Nov 2023 18:02)  BSA (m2): 1.79 (01 Nov 2023 18:02)      REVIEW OF SYSTEMS:    CONSTITUTIONAL:  As per HPI.    HEENT:  Eyes:  No diplopia or blurred vision. ENT:  No earache, sore throat or runny nose.    CARDIOVASCULAR:  No pressure, squeezing, strangling, tightness, heaviness or aching about the chest, neck, axilla or epigastrium.    RESPIRATORY:  No cough, shortness of breath, PND or orthopnea.    GASTROINTESTINAL:  No nausea, vomiting or diarrhea.    GENITOURINARY:  No dysuria, frequency or urgency.    MUSCULOSKELETAL:  As per HPI.    SKIN:  No change in skin, hair or nails.    NEUROLOGIC:  No paresthesias, fasciculations, seizures or weakness.                  PHYSICAL EXAMINATION:    GENERAL: The patient is a _____in no apparent distress. ___     VITAL SIGNS: T(C): 36.6 (11-02-23 @ 15:36), Max: 37.2 (11-01-23 @ 18:02)  HR: 74 (11-02-23 @ 15:36) (58 - 82)  BP: 121/71 (11-02-23 @ 15:36) (109/66 - 142/80)  RR: 18 (11-02-23 @ 15:36) (18 - 20)  SpO2: 94% (11-02-23 @ 15:36) (94% - 99%)  Wt(kg): --    HEENT: Head is normocephalic and atraumatic.  ANICTERIC  NECK: Supple. No carotid bruits.   LEFT SIDED AREAOF NECK SWELLING SUBMANDIBULAR  AREA MOBILE    LUNGS:COARSE BREATH SOUNDS    HEART: Regular rate and rhythm without murmur.    ABDOMEN: Soft, nontender, and nondistended.  Positive bowel sounds.  No hepatosplenomegaly was noted. NO REBOUND NO GUARDING    EXTREMITIES: NO EDEMA NO ERYTHEMA    NEUROLOGIC: NON FOCAL      SKIN: No ulceration or induration present. NO RASH        BLOOD CULTURES       URINE CX          LABS:                        14.3   6.84  )-----------( 318      ( 01 Nov 2023 20:00 )             40.3     11-01    138  |  99  |  9.4  ----------------------------<  415<H>  4.8   |  28.0  |  0.61    Ca    9.3      01 Nov 2023 20:00    TPro  8.1  /  Alb  4.1  /  TBili  0.3<L>  /  DBili  x   /  AST  29  /  ALT  50<H>  /  AlkPhos  168<H>  11-01      Urinalysis Basic - ( 01 Nov 2023 20:00 )    Color: x / Appearance: x / SG: x / pH: x  Gluc: 415 mg/dL / Ketone: x  / Bili: x / Urobili: x   Blood: x / Protein: x / Nitrite: x   Leuk Esterase: x / RBC: x / WBC x   Sq Epi: x / Non Sq Epi: x / Bacteria: x        RADIOLOGY & ADDITIONAL STUDIES:      ASSESSMENT/PLAN  38 year old male with no prior PMH presents from home with 1 week increasing neck mass. States that approx 1 week ago he started noticing some swelling under his jaw after having localized pain there. Reports that i has grown in size since then which is why he decided on coming to the hospital, In the ER imaging done and ENT consulted for a 5 cm mass.    He denies any recent travel history, Born [facility], [place] to [place] ago), no recent sick contacts (including TB), no fevers, chills, night sweats or unintentional weight loss. Does repots a mild decrease in appetite for ~ 6 months. No recent dental work     AS ABOVE WITH NECK MASS NON TOXIC  AFEBRILE CT SCAN ? INFECTIOUS PROCESS  DENIES ILL HOUSHOLD CONTACTS NO PETS AT HOME NO RECENT TRAVEL  REIVEWED CT SCAN WITH RADIOLOGY  IR TO  TRY TO DO ASPIRATE FOR CX GM STAIN  FUNGAL AND AFB  DOUBT TB BUT CAN DO QUANTERFERON   DEFER ABX FOR NOW UNTIL AFTER ASPIRATION  DOES  NOT APPEAR TO BE ODONTOGENIC  WILL FOLLOWUP   SPOKE TO HOSPITALIST                   LAMBERTO PINEDA MD

## 2023-11-02 NOTE — CONSULT NOTE ADULT - ASSESSMENT
DM type 2, newly diagnosed, uncontrolled  Possible neck abscess, ?scrofula    Initiation of insulin advisable for prompt improvement in hyperglycemia given possible abscess

## 2023-11-02 NOTE — ED ADULT NURSE REASSESSMENT NOTE - NS ED NURSE REASSESS COMMENT FT1
Assumed care of pt at 07:15 as stated in report from RN MICHEAL. Charting as noted. Patient A&O x4, denies pain/discomfort, denies CP/SOB. Updated on the plan of care. Call bell within reach, bed locked in lowest position. IV site flushed w/ NS. No redness, swelling or pain noted to site. No signs of acute distress noted, safety maintained.

## 2023-11-02 NOTE — CONSULT NOTE ADULT - PROBLEM SELECTOR RECOMMENDATION 9
- No acute surgical intervention at this time. Based on history, physical exam and newly diagnosed diabetes, mass most likely correlated with abscess though mass not completely ruled out. Recommend starting IV unasyn and admit to medicine for further glycemic control  - If no resolution of change in mass size by tomorrow after multiple rounds of IV antibiotics, will consider doing biopsy in house  - ENT will continue to follow, please call [phone] for further questions.    Plan discussed with Dr. Jalloh. - No acute surgical intervention at this time. Based on history, physical exam and newly diagnosed diabetes, mass most likely correlated with abscess though mass not completely ruled out. Recommend starting IV unasyn and admit to medicine for further glycemic control  - F/u blood cultures  - If no resolution of change in mass size by tomorrow after multiple rounds of IV antibiotics, will consider doing biopsy in house  - ENT will continue to follow, please call [phone] for further questions.    Plan discussed with Dr. Jalloh.

## 2023-11-03 LAB
ANION GAP SERPL CALC-SCNC: 13 MMOL/L — SIGNIFICANT CHANGE UP (ref 5–17)
ANION GAP SERPL CALC-SCNC: 13 MMOL/L — SIGNIFICANT CHANGE UP (ref 5–17)
BASOPHILS # BLD AUTO: 0.05 K/UL — SIGNIFICANT CHANGE UP (ref 0–0.2)
BASOPHILS # BLD AUTO: 0.05 K/UL — SIGNIFICANT CHANGE UP (ref 0–0.2)
BASOPHILS NFR BLD AUTO: 0.5 % — SIGNIFICANT CHANGE UP (ref 0–2)
BASOPHILS NFR BLD AUTO: 0.5 % — SIGNIFICANT CHANGE UP (ref 0–2)
BUN SERPL-MCNC: 15.5 MG/DL — SIGNIFICANT CHANGE UP (ref 8–20)
BUN SERPL-MCNC: 15.5 MG/DL — SIGNIFICANT CHANGE UP (ref 8–20)
CALCIUM SERPL-MCNC: 8.8 MG/DL — SIGNIFICANT CHANGE UP (ref 8.4–10.5)
CALCIUM SERPL-MCNC: 8.8 MG/DL — SIGNIFICANT CHANGE UP (ref 8.4–10.5)
CHLORIDE SERPL-SCNC: 102 MMOL/L — SIGNIFICANT CHANGE UP (ref 96–108)
CHLORIDE SERPL-SCNC: 102 MMOL/L — SIGNIFICANT CHANGE UP (ref 96–108)
CO2 SERPL-SCNC: 26 MMOL/L — SIGNIFICANT CHANGE UP (ref 22–29)
CO2 SERPL-SCNC: 26 MMOL/L — SIGNIFICANT CHANGE UP (ref 22–29)
CREAT SERPL-MCNC: 0.57 MG/DL — SIGNIFICANT CHANGE UP (ref 0.5–1.3)
CREAT SERPL-MCNC: 0.57 MG/DL — SIGNIFICANT CHANGE UP (ref 0.5–1.3)
CRP SERPL-MCNC: 6 MG/L — HIGH
CRP SERPL-MCNC: 6 MG/L — HIGH
EGFR: 127 ML/MIN/1.73M2 — SIGNIFICANT CHANGE UP
EGFR: 127 ML/MIN/1.73M2 — SIGNIFICANT CHANGE UP
EOSINOPHIL # BLD AUTO: 0.1 K/UL — SIGNIFICANT CHANGE UP (ref 0–0.5)
EOSINOPHIL # BLD AUTO: 0.1 K/UL — SIGNIFICANT CHANGE UP (ref 0–0.5)
EOSINOPHIL NFR BLD AUTO: 1.1 % — SIGNIFICANT CHANGE UP (ref 0–6)
EOSINOPHIL NFR BLD AUTO: 1.1 % — SIGNIFICANT CHANGE UP (ref 0–6)
ERYTHROCYTE [SEDIMENTATION RATE] IN BLOOD: 14 MM/HR — SIGNIFICANT CHANGE UP (ref 0–15)
ERYTHROCYTE [SEDIMENTATION RATE] IN BLOOD: 14 MM/HR — SIGNIFICANT CHANGE UP (ref 0–15)
GLUCOSE BLDC GLUCOMTR-MCNC: 102 MG/DL — HIGH (ref 70–99)
GLUCOSE BLDC GLUCOMTR-MCNC: 102 MG/DL — HIGH (ref 70–99)
GLUCOSE BLDC GLUCOMTR-MCNC: 144 MG/DL — HIGH (ref 70–99)
GLUCOSE BLDC GLUCOMTR-MCNC: 144 MG/DL — HIGH (ref 70–99)
GLUCOSE BLDC GLUCOMTR-MCNC: 221 MG/DL — HIGH (ref 70–99)
GLUCOSE BLDC GLUCOMTR-MCNC: 221 MG/DL — HIGH (ref 70–99)
GLUCOSE SERPL-MCNC: 192 MG/DL — HIGH (ref 70–99)
GLUCOSE SERPL-MCNC: 192 MG/DL — HIGH (ref 70–99)
GRAM STN FLD: SIGNIFICANT CHANGE UP
GRAM STN FLD: SIGNIFICANT CHANGE UP
HCT VFR BLD CALC: 39.1 % — SIGNIFICANT CHANGE UP (ref 39–50)
HCT VFR BLD CALC: 39.1 % — SIGNIFICANT CHANGE UP (ref 39–50)
HGB BLD-MCNC: 13.2 G/DL — SIGNIFICANT CHANGE UP (ref 13–17)
HGB BLD-MCNC: 13.2 G/DL — SIGNIFICANT CHANGE UP (ref 13–17)
IMM GRANULOCYTES NFR BLD AUTO: 0.3 % — SIGNIFICANT CHANGE UP (ref 0–0.9)
IMM GRANULOCYTES NFR BLD AUTO: 0.3 % — SIGNIFICANT CHANGE UP (ref 0–0.9)
LYMPHOCYTES # BLD AUTO: 2.92 K/UL — SIGNIFICANT CHANGE UP (ref 1–3.3)
LYMPHOCYTES # BLD AUTO: 2.92 K/UL — SIGNIFICANT CHANGE UP (ref 1–3.3)
LYMPHOCYTES # BLD AUTO: 30.7 % — SIGNIFICANT CHANGE UP (ref 13–44)
LYMPHOCYTES # BLD AUTO: 30.7 % — SIGNIFICANT CHANGE UP (ref 13–44)
MAGNESIUM SERPL-MCNC: 2 MG/DL — SIGNIFICANT CHANGE UP (ref 1.6–2.6)
MAGNESIUM SERPL-MCNC: 2 MG/DL — SIGNIFICANT CHANGE UP (ref 1.6–2.6)
MCHC RBC-ENTMCNC: 30.1 PG — SIGNIFICANT CHANGE UP (ref 27–34)
MCHC RBC-ENTMCNC: 30.1 PG — SIGNIFICANT CHANGE UP (ref 27–34)
MCHC RBC-ENTMCNC: 33.8 GM/DL — SIGNIFICANT CHANGE UP (ref 32–36)
MCHC RBC-ENTMCNC: 33.8 GM/DL — SIGNIFICANT CHANGE UP (ref 32–36)
MCV RBC AUTO: 89.3 FL — SIGNIFICANT CHANGE UP (ref 80–100)
MCV RBC AUTO: 89.3 FL — SIGNIFICANT CHANGE UP (ref 80–100)
MONOCYTES # BLD AUTO: 0.8 K/UL — SIGNIFICANT CHANGE UP (ref 0–0.9)
MONOCYTES # BLD AUTO: 0.8 K/UL — SIGNIFICANT CHANGE UP (ref 0–0.9)
MONOCYTES NFR BLD AUTO: 8.4 % — SIGNIFICANT CHANGE UP (ref 2–14)
MONOCYTES NFR BLD AUTO: 8.4 % — SIGNIFICANT CHANGE UP (ref 2–14)
NEUTROPHILS # BLD AUTO: 5.61 K/UL — SIGNIFICANT CHANGE UP (ref 1.8–7.4)
NEUTROPHILS # BLD AUTO: 5.61 K/UL — SIGNIFICANT CHANGE UP (ref 1.8–7.4)
NEUTROPHILS NFR BLD AUTO: 59 % — SIGNIFICANT CHANGE UP (ref 43–77)
NEUTROPHILS NFR BLD AUTO: 59 % — SIGNIFICANT CHANGE UP (ref 43–77)
NIGHT BLUE STAIN TISS: SIGNIFICANT CHANGE UP
PLATELET # BLD AUTO: 312 K/UL — SIGNIFICANT CHANGE UP (ref 150–400)
PLATELET # BLD AUTO: 312 K/UL — SIGNIFICANT CHANGE UP (ref 150–400)
POTASSIUM SERPL-MCNC: 3.6 MMOL/L — SIGNIFICANT CHANGE UP (ref 3.5–5.3)
POTASSIUM SERPL-MCNC: 3.6 MMOL/L — SIGNIFICANT CHANGE UP (ref 3.5–5.3)
POTASSIUM SERPL-SCNC: 3.6 MMOL/L — SIGNIFICANT CHANGE UP (ref 3.5–5.3)
POTASSIUM SERPL-SCNC: 3.6 MMOL/L — SIGNIFICANT CHANGE UP (ref 3.5–5.3)
RBC # BLD: 4.38 M/UL — SIGNIFICANT CHANGE UP (ref 4.2–5.8)
RBC # BLD: 4.38 M/UL — SIGNIFICANT CHANGE UP (ref 4.2–5.8)
RBC # FLD: 11.8 % — SIGNIFICANT CHANGE UP (ref 10.3–14.5)
RBC # FLD: 11.8 % — SIGNIFICANT CHANGE UP (ref 10.3–14.5)
SODIUM SERPL-SCNC: 140 MMOL/L — SIGNIFICANT CHANGE UP (ref 135–145)
SODIUM SERPL-SCNC: 140 MMOL/L — SIGNIFICANT CHANGE UP (ref 135–145)
SPECIMEN SOURCE: SIGNIFICANT CHANGE UP
WBC # BLD: 9.51 K/UL — SIGNIFICANT CHANGE UP (ref 3.8–10.5)
WBC # BLD: 9.51 K/UL — SIGNIFICANT CHANGE UP (ref 3.8–10.5)
WBC # FLD AUTO: 9.51 K/UL — SIGNIFICANT CHANGE UP (ref 3.8–10.5)
WBC # FLD AUTO: 9.51 K/UL — SIGNIFICANT CHANGE UP (ref 3.8–10.5)

## 2023-11-03 PROCEDURE — 10006 FNA BX W/US GDN EA ADDL: CPT

## 2023-11-03 PROCEDURE — 99232 SBSQ HOSP IP/OBS MODERATE 35: CPT

## 2023-11-03 PROCEDURE — 71045 X-RAY EXAM CHEST 1 VIEW: CPT | Mod: 26

## 2023-11-03 PROCEDURE — 99233 SBSQ HOSP IP/OBS HIGH 50: CPT

## 2023-11-03 PROCEDURE — 88173 CYTOPATH EVAL FNA REPORT: CPT | Mod: 26

## 2023-11-03 PROCEDURE — 10005 FNA BX W/US GDN 1ST LES: CPT

## 2023-11-03 PROCEDURE — 88189 FLOWCYTOMETRY/READ 16 & >: CPT

## 2023-11-03 PROCEDURE — 71250 CT THORAX DX C-: CPT | Mod: 26

## 2023-11-03 RX ORDER — INSULIN GLARGINE 100 [IU]/ML
20 INJECTION, SOLUTION SUBCUTANEOUS AT BEDTIME
Refills: 0 | Status: DISCONTINUED | OUTPATIENT
Start: 2023-11-03 | End: 2023-11-06

## 2023-11-03 RX ORDER — AMPICILLIN SODIUM AND SULBACTAM SODIUM 250; 125 MG/ML; MG/ML
3 INJECTION, POWDER, FOR SUSPENSION INTRAMUSCULAR; INTRAVENOUS EVERY 6 HOURS
Refills: 0 | Status: DISCONTINUED | OUTPATIENT
Start: 2023-11-03 | End: 2023-11-07

## 2023-11-03 RX ORDER — AMPICILLIN SODIUM AND SULBACTAM SODIUM 250; 125 MG/ML; MG/ML
3 INJECTION, POWDER, FOR SUSPENSION INTRAMUSCULAR; INTRAVENOUS ONCE
Refills: 0 | Status: COMPLETED | OUTPATIENT
Start: 2023-11-03 | End: 2023-11-03

## 2023-11-03 RX ORDER — AMPICILLIN SODIUM AND SULBACTAM SODIUM 250; 125 MG/ML; MG/ML
INJECTION, POWDER, FOR SUSPENSION INTRAMUSCULAR; INTRAVENOUS
Refills: 0 | Status: DISCONTINUED | OUTPATIENT
Start: 2023-11-03 | End: 2023-11-07

## 2023-11-03 RX ADMIN — Medication 650 MILLIGRAM(S): at 17:54

## 2023-11-03 RX ADMIN — Medication 4: at 09:37

## 2023-11-03 RX ADMIN — AMPICILLIN SODIUM AND SULBACTAM SODIUM 200 GRAM(S): 250; 125 INJECTION, POWDER, FOR SUSPENSION INTRAMUSCULAR; INTRAVENOUS at 22:38

## 2023-11-03 RX ADMIN — AMPICILLIN SODIUM AND SULBACTAM SODIUM 200 GRAM(S): 250; 125 INJECTION, POWDER, FOR SUSPENSION INTRAMUSCULAR; INTRAVENOUS at 17:55

## 2023-11-03 RX ADMIN — Medication 650 MILLIGRAM(S): at 18:24

## 2023-11-03 RX ADMIN — Medication 4 UNIT(S): at 12:00

## 2023-11-03 RX ADMIN — Medication 4 UNIT(S): at 09:38

## 2023-11-03 RX ADMIN — Medication 4 UNIT(S): at 17:55

## 2023-11-03 RX ADMIN — Medication 2: at 12:00

## 2023-11-03 RX ADMIN — INSULIN GLARGINE 20 UNIT(S): 100 INJECTION, SOLUTION SUBCUTANEOUS at 22:38

## 2023-11-03 RX ADMIN — AMPICILLIN SODIUM AND SULBACTAM SODIUM 200 GRAM(S): 250; 125 INJECTION, POWDER, FOR SUSPENSION INTRAMUSCULAR; INTRAVENOUS at 00:07

## 2023-11-03 RX ADMIN — AMPICILLIN SODIUM AND SULBACTAM SODIUM 200 GRAM(S): 250; 125 INJECTION, POWDER, FOR SUSPENSION INTRAMUSCULAR; INTRAVENOUS at 11:55

## 2023-11-03 NOTE — PROGRESS NOTE ADULT - ASSESSMENT
38M with no PMHx presented to hospital because of several week history of enlarging neck mass, incidentally found to have diabetes. Endorses recent polydipsia and polyuria.    1. New onset diabetes, uncontrolled - a1c 12%  - Increase lantus to 20 units qhs  - Contine admelog 4 units tid  - Sliding scale coverage  - Needs diabetes and insulin education, meter teach    2. Neck Mass  - S/p IR needle aspiration  - ID following

## 2023-11-03 NOTE — PROGRESS NOTE ADULT - SUBJECTIVE AND OBJECTIVE BOX
INFECTIOUS DISEASES AND INTERNAL MEDICINE at [facility]  =======================================================  Zelalem Almodovar MD  Diplomates American Board of Internal Medicine and Infectious Diseases  Telephone [phone]  Fax            [phone]  =======================================================    MUMTAZTIFFANYGREGORYJABIERS [id number]    Follow up: NECK ABSCESS    Allergies:  No Known Allergies      Medications:  acetaminophen     Tablet .. 650 milliGRAM(s) Oral every 6 hours PRN  aluminum hydroxide/magnesium hydroxide/simethicone Suspension 30 milliLiter(s) Oral every 4 hours PRN  ampicillin/sulbactam  IVPB      dextrose 5%. 1000 milliLiter(s) IV Continuous <Continuous>  dextrose 5%. 1000 milliLiter(s) IV Continuous <Continuous>  dextrose 50% Injectable 12.5 Gram(s) IV Push once  dextrose 50% Injectable 25 Gram(s) IV Push once  dextrose 50% Injectable 25 Gram(s) IV Push once  dextrose Oral Gel 15 Gram(s) Oral once PRN  glucagon  Injectable 1 milliGRAM(s) IntraMuscular once  insulin glargine Injectable (LANTUS) 20 Unit(s) SubCutaneous at bedtime  insulin lispro (ADMELOG) corrective regimen sliding scale   SubCutaneous at bedtime  insulin lispro (ADMELOG) corrective regimen sliding scale   SubCutaneous three times a day before meals  insulin lispro Injectable (ADMELOG) 4 Unit(s) SubCutaneous three times a day before meals  melatonin 3 milliGRAM(s) Oral at bedtime PRN  ondansetron Injectable 4 milliGRAM(s) IV Push every 8 hours PRN    SOCIAL       FAMILY   FAMILY HISTORY:    REVIEW OF SYSTEMS:  CONSTITUTIONAL:  No Fever or chills  HEENT:   No diplopia or blurred vision.  No earache, sore throat or runny nose.  CARDIOVASCULAR:  No pressure, squeezing, strangling, tightness, heaviness or aching about the chest, neck, axilla or epigastrium.  RESPIRATORY:  No cough, shortness of breath, PND or orthopnea.  GASTROINTESTINAL:  No nausea, vomiting or diarrhea.  GENITOURINARY:  No dysuria, frequency or urgency. No Blood in urine  MUSCULOSKELETAL:   moves all joints  SKIN:  No change in skin, hair or nails.  NEUROLOGIC:  No paresthesias, fasciculations, seizures or weakness.  PSYCHIATRIC:  No disorder of thought or mood.  ENDOCRINE:  No heat or cold intolerance, polyuria or polydipsia.  HEMATOLOGICAL:  No easy bruising or bleeding.            Physical Exam:  ICU Vital Signs Last 24 Hrs  T(C): 36.7 (03 Nov 2023 09:41), Max: 36.8 (02 Nov 2023 19:49)  T(F): 98 (03 Nov 2023 09:41), Max: 98.2 (02 Nov 2023 19:49)  HR: 67 (03 Nov 2023 09:41) (65 - 74)  BP: 109/68 (03 Nov 2023 09:41) (103/65 - 125/78)  BP(mean): 81 (03 Nov 2023 09:41) (81 - 81)  ABP: --  ABP(mean): --  RR: 18 (03 Nov 2023 09:41) (18 - 18)  SpO2: 97% (03 Nov 2023 09:41) (94% - 98%)    O2 Parameters below as of 03 Nov 2023 09:41  Patient On (Oxygen Delivery Method): room air          GEN: NAD,   HEENT: normocephalic and atraumatic. EOMI. SASHA.    NECK: Supple. No carotid bruits.  No lymphadenopathy or thyromegaly.LEFT  CERVICAL LN  EDEMA TENDERENESS  LUNGS: Clear to auscultation.  HEART: Regular rate and rhythm without murmur.  ABDOMEN: Soft, nontender, and nondistended.  Positive bowel sounds.    : No CVA tenderness  EXTREMITIES: Without any cyanosis, clubbing, rash, lesions or edema.  MSK: no joint swelling  NEUROLOGIC: Cranial nerves II through XII are grossly intact.  PSYCHIATRIC: Appropriate affect .  SKIN: No ulceration or induration present.        Labs:  Vitals:  ============  T(F): 98 (03 Nov 2023 09:41), Max: 98.2 (02 Nov 2023 19:49)  HR: 67 (03 Nov 2023 09:41)  BP: 109/68 (03 Nov 2023 09:41)  RR: 18 (03 Nov 2023 09:41)  SpO2: 97% (03 Nov 2023 09:41) (94% - 98%)  temp max in last 48H T(F): , Max: 99 (11-01-23 @ 18:02)    =======================================================  Current Antibiotics:  ampicillin/sulbactam  IVPB        Other medications:  dextrose 5%. 1000 milliLiter(s) IV Continuous <Continuous>  dextrose 5%. 1000 milliLiter(s) IV Continuous <Continuous>  dextrose 50% Injectable 12.5 Gram(s) IV Push once  dextrose 50% Injectable 25 Gram(s) IV Push once  dextrose 50% Injectable 25 Gram(s) IV Push once  glucagon  Injectable 1 milliGRAM(s) IntraMuscular once  insulin glargine Injectable (LANTUS) 20 Unit(s) SubCutaneous at bedtime  insulin lispro (ADMELOG) corrective regimen sliding scale   SubCutaneous three times a day before meals  insulin lispro (ADMELOG) corrective regimen sliding scale   SubCutaneous at bedtime  insulin lispro Injectable (ADMELOG) 4 Unit(s) SubCutaneous three times a day before meals      =======================================================  Labs:                        13.2   9.51  )-----------( 312      ( 03 Nov 2023 02:49 )             39.1     11-03    140  |  102  |  15.5  ----------------------------<  192<H>  3.6   |  26.0  |  0.57    Ca    8.8      03 Nov 2023 02:49  Mg     2.0     11-03    TPro  8.1  /  Alb  4.1  /  TBili  0.3<L>  /  DBili  x   /  AST  29  /  ALT  50<H>  /  AlkPhos  168<H>  11-01      Culture - Acid Fast - Blood (collected 11-02-23 @ 15:44)  Source: .Blood Blood      Creatinine: 0.57 mg/dL (11-03-23 @ 02:49)  Creatinine: 0.61 mg/dL (11-01-23 @ 20:00)          C-Reactive Protein, Serum: 6 mg/L (11-03-23 @ 02:49)  C-Reactive Protein, Serum: 8 mg/L (11-02-23 @ 13:15)    WBC Count: 9.51 K/uL (11-03-23 @ 02:49)  WBC Count: 6.84 K/uL (11-01-23 @ 20:00)        Alkaline Phosphatase: 168 U/L (11-01-23 @ 20:00)  Alanine Aminotransferase (ALT/SGPT): 50 U/L (11-01-23 @ 20:00)  Aspartate Aminotransferase (AST/SGOT): 29 U/L (11-01-23 @ 20:00)  Bilirubin Total: 0.3 mg/dL (11-01-23 @ 20:00)

## 2023-11-03 NOTE — PROGRESS NOTE ADULT - ASSESSMENT
38 year old male with no prior PMH presents from home with 1 week increasing neck mass. States that approx 1 week ago he started noticing some swelling under his jaw after having localized pain there. Reports that i has grown in size since then which is why he decided on coming to the hospital, In the ER imaging done and ENT consulted for a 5 cm mass.    He denies any recent travel history, Born [facility], moved to [place] ago), no recent sick contacts (including TB), no fevers, chills, night sweats or unintentional weight loss. Does repots a mild decrease in appetite for ~ 6 months. No recent dental work     AS ABOVE WITH NECK MASS NON TOXIC  AFEBRILE CT SCAN ? INFECTIOUS PROCESS  DENIES ILL HOUSHOLD CONTACTS NO PETS AT HOME NO RECENT TRAVEL  REIVEWED CT SCAN WITH RADIOLOGY LAST PM     ASPIRATION DONEFOR CX GM STAIN  FUNGAL AND AFB  PUS  NOTED  WHEN ASPIRATION DONE  DOUBT TB BUT CAN DO QUANTERFERON   PT PLACED ON ISOLATION BUT NO COUGH   SUGGEST  CXR NEG CT CHEST R/O  PULM PROCESS'  IF BOTH NEG WOULD D/C TEJAS        SPOKE TO HOSPITALIST

## 2023-11-03 NOTE — PROGRESS NOTE ADULT - SUBJECTIVE AND OBJECTIVE BOX
ENT ISSUE/POD: L neck mass     Subjective/Interval: Patient seen at bedside today s/p IR procedure feeling well. He has mild soreness to L neck, similar size to prior. He reports compliance with insulin regimen and expressed importance of this for wound healing. Good appetite today, denies SOB, dysphagia or odynophagia.        PAST MEDICAL & SURGICAL HISTORY:    Allergies    No Known Allergies    Intolerances      MEDICATIONS  (STANDING):  dextrose 5%. 1000 milliLiter(s) (100 mL/Hr) IV Continuous <Continuous>  dextrose 5%. 1000 milliLiter(s) (50 mL/Hr) IV Continuous <Continuous>  dextrose 50% Injectable 25 Gram(s) IV Push once  dextrose 50% Injectable 25 Gram(s) IV Push once  dextrose 50% Injectable 12.5 Gram(s) IV Push once  glucagon  Injectable 1 milliGRAM(s) IntraMuscular once  insulin glargine Injectable (LANTUS) 20 Unit(s) SubCutaneous at bedtime  insulin lispro (ADMELOG) corrective regimen sliding scale   SubCutaneous three times a day before meals  insulin lispro (ADMELOG) corrective regimen sliding scale   SubCutaneous at bedtime  insulin lispro Injectable (ADMELOG) 4 Unit(s) SubCutaneous three times a day before meals    MEDICATIONS  (PRN):  acetaminophen     Tablet .. 650 milliGRAM(s) Oral every 6 hours PRN Temp greater or equal to 38C (100.4F), Mild Pain (1 - 3)  aluminum hydroxide/magnesium hydroxide/simethicone Suspension 30 milliLiter(s) Oral every 4 hours PRN Dyspepsia  dextrose Oral Gel 15 Gram(s) Oral once PRN Blood Glucose LESS THAN 70 milliGRAM(s)/deciliter  melatonin 3 milliGRAM(s) Oral at bedtime PRN Insomnia  ondansetron Injectable 4 milliGRAM(s) IV Push every 8 hours PRN Nausea and/or Vomiting      Social History: see consult note    Family history: see consult note    ROS:   ENT: all negative except as noted in HPI   Pulm: denies SOB, cough, hemoptysis  Neuro: denies numbness/tingling, loss of sensation  Endo: denies heat/cold intolerance, excessive sweating      Vital Signs Last 24 Hrs  T(C): 36.7 (03 Nov 2023 09:41), Max: 36.8 (02 Nov 2023 19:49)  T(F): 98 (03 Nov 2023 09:41), Max: 98.2 (02 Nov 2023 19:49)  HR: 67 (03 Nov 2023 09:41) (65 - 74)  BP: 109/68 (03 Nov 2023 09:41) (103/65 - 125/78)  BP(mean): 81 (03 Nov 2023 09:41) (81 - 81)  RR: 18 (03 Nov 2023 09:41) (18 - 18)  SpO2: 97% (03 Nov 2023 09:41) (94% - 98%)    Parameters below as of 03 Nov 2023 09:41  Patient On (Oxygen Delivery Method): room air                              13.2   9.51  )-----------( 312      ( 03 Nov 2023 02:49 )             39.1    11-03    140  |  102  |  15.5  ----------------------------<  192<H>  3.6   |  26.0  |  0.57    Ca    8.8      03 Nov 2023 02:49  Mg     2.0     11-03    TPro  8.1  /  Alb  4.1  /  TBili  0.3<L>  /  DBili  x   /  AST  29  /  ALT  50<H>  /  AlkPhos  168<H>  11-01       PHYSICAL EXAM:  Gen: NAD  Skin: No rashes, bruises, or lesions  Head: Normocephalic, Atraumatic  Face: no edema, erythema, or fluctuance. Parotid glands soft without mass  Eyes: no scleral injection  Ears: external exam unremarkable  Nose: Nares bilaterally patent, no discharge  Mouth: No Stridor / Drooling / Trismus.  Mucosa moist, tongue/uvula midline, oropharynx clear  Neck: firm 5cm palpable and mobile mass, no erythema, fluctuance or discharge noted; similar in size   Lymphatic: +cervical lymphadenopathy  Resp: breathing easily, no stridor  Neuro: facial nerve intact, no facial droop       ENT ISSUE/POD: L neck mass     Subjective/Interval: Patient seen at bedside today s/p IR procedure feeling well. He has mild soreness/tenderness to L neck, similar size to prior. He reports compliance with insulin regimen and expressed importance of this for wound healing. Good appetite today, denies SOB, dysphagia or odynophagia.        PAST MEDICAL & SURGICAL HISTORY:    Allergies    No Known Allergies    Intolerances      MEDICATIONS  (STANDING):  dextrose 5%. 1000 milliLiter(s) (100 mL/Hr) IV Continuous <Continuous>  dextrose 5%. 1000 milliLiter(s) (50 mL/Hr) IV Continuous <Continuous>  dextrose 50% Injectable 25 Gram(s) IV Push once  dextrose 50% Injectable 25 Gram(s) IV Push once  dextrose 50% Injectable 12.5 Gram(s) IV Push once  glucagon  Injectable 1 milliGRAM(s) IntraMuscular once  insulin glargine Injectable (LANTUS) 20 Unit(s) SubCutaneous at bedtime  insulin lispro (ADMELOG) corrective regimen sliding scale   SubCutaneous three times a day before meals  insulin lispro (ADMELOG) corrective regimen sliding scale   SubCutaneous at bedtime  insulin lispro Injectable (ADMELOG) 4 Unit(s) SubCutaneous three times a day before meals    MEDICATIONS  (PRN):  acetaminophen     Tablet .. 650 milliGRAM(s) Oral every 6 hours PRN Temp greater or equal to 38C (100.4F), Mild Pain (1 - 3)  aluminum hydroxide/magnesium hydroxide/simethicone Suspension 30 milliLiter(s) Oral every 4 hours PRN Dyspepsia  dextrose Oral Gel 15 Gram(s) Oral once PRN Blood Glucose LESS THAN 70 milliGRAM(s)/deciliter  melatonin 3 milliGRAM(s) Oral at bedtime PRN Insomnia  ondansetron Injectable 4 milliGRAM(s) IV Push every 8 hours PRN Nausea and/or Vomiting      Social History: see consult note    Family history: see consult note    ROS:   ENT: all negative except as noted in HPI   Pulm: denies SOB, cough, hemoptysis  Neuro: denies numbness/tingling, loss of sensation  Endo: denies heat/cold intolerance, excessive sweating      Vital Signs Last 24 Hrs  T(C): 36.7 (03 Nov 2023 09:41), Max: 36.8 (02 Nov 2023 19:49)  T(F): 98 (03 Nov 2023 09:41), Max: 98.2 (02 Nov 2023 19:49)  HR: 67 (03 Nov 2023 09:41) (65 - 74)  BP: 109/68 (03 Nov 2023 09:41) (103/65 - 125/78)  BP(mean): 81 (03 Nov 2023 09:41) (81 - 81)  RR: 18 (03 Nov 2023 09:41) (18 - 18)  SpO2: 97% (03 Nov 2023 09:41) (94% - 98%)    Parameters below as of 03 Nov 2023 09:41  Patient On (Oxygen Delivery Method): room air                              13.2   9.51  )-----------( 312      ( 03 Nov 2023 02:49 )             39.1    11-03    140  |  102  |  15.5  ----------------------------<  192<H>  3.6   |  26.0  |  0.57    Ca    8.8      03 Nov 2023 02:49  Mg     2.0     11-03    TPro  8.1  /  Alb  4.1  /  TBili  0.3<L>  /  DBili  x   /  AST  29  /  ALT  50<H>  /  AlkPhos  168<H>  11-01       PHYSICAL EXAM:  Gen: NAD  Skin: No rashes, bruises, or lesions  Head: Normocephalic, Atraumatic  Face: no edema, erythema, or fluctuance. Parotid glands soft without mass  Eyes: no scleral injection  Ears: external exam unremarkable  Nose: Nares bilaterally patent, no discharge  Mouth: No Stridor / Drooling / Trismus.  Mucosa moist, tongue/uvula midline, oropharynx clear  Neck: firm 5cm palpable and mobile mass, no erythema, fluctuance or discharge noted; similar in size   Lymphatic: +cervical lymphadenopathy  Resp: breathing easily, no stridor  Neuro: facial nerve intact, no facial droop

## 2023-11-03 NOTE — PROGRESS NOTE ADULT - SUBJECTIVE AND OBJECTIVE BOX
Patient is a 38y old  Male who presents with a chief complaint of     Patient seen and examined at bedside. No overnight events reported.  s/p IR guided left neck mass bx. wife at bedside   Italian speaking ,  used     ALLERGIES:  No Known Allergies    MEDICATIONS  (STANDING):  ampicillin/sulbactam  IVPB      ampicillin/sulbactam  IVPB 3 Gram(s) IV Intermittent every 6 hours  dextrose 5%. 1000 milliLiter(s) (100 mL/Hr) IV Continuous <Continuous>  dextrose 5%. 1000 milliLiter(s) (50 mL/Hr) IV Continuous <Continuous>  dextrose 50% Injectable 25 Gram(s) IV Push once  dextrose 50% Injectable 25 Gram(s) IV Push once  dextrose 50% Injectable 12.5 Gram(s) IV Push once  glucagon  Injectable 1 milliGRAM(s) IntraMuscular once  insulin glargine Injectable (LANTUS) 20 Unit(s) SubCutaneous at bedtime  insulin lispro (ADMELOG) corrective regimen sliding scale   SubCutaneous at bedtime  insulin lispro (ADMELOG) corrective regimen sliding scale   SubCutaneous three times a day before meals  insulin lispro Injectable (ADMELOG) 4 Unit(s) SubCutaneous three times a day before meals    MEDICATIONS  (PRN):  acetaminophen     Tablet .. 650 milliGRAM(s) Oral every 6 hours PRN Temp greater or equal to 38C (100.4F), Mild Pain (1 - 3)  aluminum hydroxide/magnesium hydroxide/simethicone Suspension 30 milliLiter(s) Oral every 4 hours PRN Dyspepsia  dextrose Oral Gel 15 Gram(s) Oral once PRN Blood Glucose LESS THAN 70 milliGRAM(s)/deciliter  melatonin 3 milliGRAM(s) Oral at bedtime PRN Insomnia  ondansetron Injectable 4 milliGRAM(s) IV Push every 8 hours PRN Nausea and/or Vomiting    Vital Signs Last 24 Hrs  T(F): 98.2 (03 Nov 2023 12:00), Max: 98.2 (02 Nov 2023 19:49)  HR: 70 (03 Nov 2023 12:00) (65 - 74)  BP: 127/76 (03 Nov 2023 12:00) (103/65 - 127/76)  RR: 18 (03 Nov 2023 12:00) (18 - 18)  SpO2: 96% (03 Nov 2023 12:00) (94% - 98%)  I&O's Summary    PHYSICAL EXAM:  General: NAD, A/O x 3  ENT: MMM, no thrush  Neck: left neck / supraclavicular area mass noted , non fluctuant / non tender   Lungs: Clear to auscultation bilaterally, good air entry, non-labored breathing  Cardio: RRR, S1/S2, No murmur  Abdomen: Soft, Nontender, Nondistended; Bowel sounds present  Extremities: No calf tenderness, No pitting edema    LABS:                        13.2   9.51  )-----------( 312      ( 03 Nov 2023 02:49 )             39.1     11-03    140  |  102  |  15.5  ----------------------------<  192  3.6   |  26.0  |  0.57    Ca    8.8      03 Nov 2023 02:49  Mg     2.0     11-03    TPro  8.1  /  Alb  4.1  /  TBili  0.3  /  DBili  x   /  AST  29  /  ALT  50  /  AlkPhos  168  11-01        POCT Blood Glucose.: 221 mg/dL (03 Nov 2023 09:30)  POCT Blood Glucose.: 139 mg/dL (02 Nov 2023 21:55)  POCT Blood Glucose.: 224 mg/dL (02 Nov 2023 18:16)      Urinalysis Basic - ( 03 Nov 2023 02:49 )    Color: x / Appearance: x / SG: x / pH: x  Gluc: 192 mg/dL / Ketone: x  / Bili: x / Urobili: x   Blood: x / Protein: x / Nitrite: x   Leuk Esterase: x / RBC: x / WBC x   Sq Epi: x / Non Sq Epi: x / Bacteria: x        Culture - Acid Fast - Blood (collected 02 Nov 2023 15:44)  Source: .Blood Blood        RADIOLOGY & ADDITIONAL TESTS:    Care Discussed with Consultants/Other Providers:

## 2023-11-03 NOTE — PROGRESS NOTE ADULT - ASSESSMENT
38 year old male with no prior known PMH now presenting with increasing neck mass x 1 week. Incidentally found to have uncontrolled DM. Seen by ENT,    >Left neck mass / unclear etiology Bacterial abscess vs neoplastic vs infected brachial cleft vs atypical mycobacterial vs Scrofula   Seen by ENT.   s/p IR guided bx   c/w unasyn   check ct chest/ cxr / tb QuantiFeron ,Blood cultures, ESR/CRP ,  mycobacterial culture   ID consulted    >Newly diagnosed DM   A1C 12  Appreciate Endo eval  Started on Lantus 16 unts QHS and Lispro premeal 4 units tid     >DVT ppx : SCDs    > plan of care discussed with patient , wife at bedside , id

## 2023-11-03 NOTE — PROCEDURE NOTE - PROCEDURE FINDINGS AND DETAILS
left neck collection and LN   collection aspirated -> pus --> cx, afb/fungal  path LN FNA in cyto and rpmi

## 2023-11-03 NOTE — PROGRESS NOTE ADULT - PROBLEM SELECTOR PLAN 1
- No acute surgical intervention at this time. s/p IR procedure, f/u cultures and FNA results  - Appreciate ID recs   - Glycemic control per primary team/endocrine  - ENT will continue to follow, please call [phone] for further questions.

## 2023-11-03 NOTE — PROGRESS NOTE ADULT - SUBJECTIVE AND OBJECTIVE BOX
INTERVAL EVENTS:  Follow up diabetes management    ROS: Complains of mild headache. Endorses good appetite.     MEDICATIONS  (STANDING):  dextrose 5%. 1000 milliLiter(s) (100 mL/Hr) IV Continuous <Continuous>  dextrose 5%. 1000 milliLiter(s) (50 mL/Hr) IV Continuous <Continuous>  dextrose 50% Injectable 25 Gram(s) IV Push once  dextrose 50% Injectable 25 Gram(s) IV Push once  dextrose 50% Injectable 12.5 Gram(s) IV Push once  glucagon  Injectable 1 milliGRAM(s) IntraMuscular once  insulin glargine Injectable (LANTUS) 20 Unit(s) SubCutaneous at bedtime  insulin lispro (ADMELOG) corrective regimen sliding scale   SubCutaneous three times a day before meals  insulin lispro (ADMELOG) corrective regimen sliding scale   SubCutaneous at bedtime  insulin lispro Injectable (ADMELOG) 4 Unit(s) SubCutaneous three times a day before meals    MEDICATIONS  (PRN):  acetaminophen     Tablet .. 650 milliGRAM(s) Oral every 6 hours PRN Temp greater or equal to 38C (100.4F), Mild Pain (1 - 3)  aluminum hydroxide/magnesium hydroxide/simethicone Suspension 30 milliLiter(s) Oral every 4 hours PRN Dyspepsia  dextrose Oral Gel 15 Gram(s) Oral once PRN Blood Glucose LESS THAN 70 milliGRAM(s)/deciliter  melatonin 3 milliGRAM(s) Oral at bedtime PRN Insomnia  ondansetron Injectable 4 milliGRAM(s) IV Push every 8 hours PRN Nausea and/or Vomiting    Allergies  No Known Allergies    Vital Signs Last 24 Hrs  T(C): 36.7 (03 Nov 2023 09:41), Max: 36.8 (02 Nov 2023 19:49)  T(F): 98 (03 Nov 2023 09:41), Max: 98.2 (02 Nov 2023 19:49)  HR: 67 (03 Nov 2023 09:41) (65 - 74)  BP: 109/68 (03 Nov 2023 09:41) (103/65 - 125/78)  BP(mean): 81 (03 Nov 2023 09:41) (81 - 81)  RR: 18 (03 Nov 2023 09:41) (18 - 18)  SpO2: 97% (03 Nov 2023 09:41) (94% - 98%)    Parameters below as of 03 Nov 2023 09:41  Patient On (Oxygen Delivery Method): room air    PHYSICAL EXAM:  General: No apparent distress  Neck: +left neck mass  Respiratory: Lungs clear bilaterall  Cardiac: +S1, S2, no m/r/g  GI: +BS, soft, non tender  Extremities: No peripheral soha  Neuro: A+O X3, no tremors    LABS:                        13.2   9.51  )-----------( 312      ( 03 Nov 2023 02:49 )             39.1     11-03    140  |  102  |  15.5  ----------------------------<  192<H>  3.6   |  26.0  |  0.57    Ca    8.8      03 Nov 2023 02:49  Mg     2.0     11-03    TPro  8.1  /  Alb  4.1  /  TBili  0.3<L>  /  DBili  x   /  AST  29  /  ALT  50<H>  /  AlkPhos  168<H>  11-01    Urinalysis Basic - ( 03 Nov 2023 02:49 )    Color: x / Appearance: x / SG: x / pH: x  Gluc: 192 mg/dL / Ketone: x  / Bili: x / Urobili: x   Blood: x / Protein: x / Nitrite: x   Leuk Esterase: x / RBC: x / WBC x   Sq Epi: x / Non Sq Epi: x / Bacteria: x    POCT Blood Glucose.: 221 mg/dL (11-03-23 @ 09:30)  POCT Blood Glucose.: 139 mg/dL (11-02-23 @ 21:55)  POCT Blood Glucose.: 224 mg/dL (11-02-23 @ 18:16)  POCT Blood Glucose.: 244 mg/dL (11-02-23 @ 14:04)  POCT Blood Glucose.: 250 mg/dL (11-02-23 @ 10:50)

## 2023-11-04 LAB
ALBUMIN SERPL ELPH-MCNC: 3.7 G/DL — SIGNIFICANT CHANGE UP (ref 3.3–5.2)
ALBUMIN SERPL ELPH-MCNC: 3.7 G/DL — SIGNIFICANT CHANGE UP (ref 3.3–5.2)
ALP SERPL-CCNC: 109 U/L — SIGNIFICANT CHANGE UP (ref 40–120)
ALP SERPL-CCNC: 109 U/L — SIGNIFICANT CHANGE UP (ref 40–120)
ALT FLD-CCNC: 40 U/L — SIGNIFICANT CHANGE UP
ALT FLD-CCNC: 40 U/L — SIGNIFICANT CHANGE UP
ANION GAP SERPL CALC-SCNC: 13 MMOL/L — SIGNIFICANT CHANGE UP (ref 5–17)
ANION GAP SERPL CALC-SCNC: 13 MMOL/L — SIGNIFICANT CHANGE UP (ref 5–17)
AST SERPL-CCNC: 28 U/L — SIGNIFICANT CHANGE UP
AST SERPL-CCNC: 28 U/L — SIGNIFICANT CHANGE UP
BILIRUB SERPL-MCNC: 0.5 MG/DL — SIGNIFICANT CHANGE UP (ref 0.4–2)
BILIRUB SERPL-MCNC: 0.5 MG/DL — SIGNIFICANT CHANGE UP (ref 0.4–2)
BUN SERPL-MCNC: 19 MG/DL — SIGNIFICANT CHANGE UP (ref 8–20)
BUN SERPL-MCNC: 19 MG/DL — SIGNIFICANT CHANGE UP (ref 8–20)
CALCIUM SERPL-MCNC: 8.9 MG/DL — SIGNIFICANT CHANGE UP (ref 8.4–10.5)
CALCIUM SERPL-MCNC: 8.9 MG/DL — SIGNIFICANT CHANGE UP (ref 8.4–10.5)
CHLORIDE SERPL-SCNC: 102 MMOL/L — SIGNIFICANT CHANGE UP (ref 96–108)
CHLORIDE SERPL-SCNC: 102 MMOL/L — SIGNIFICANT CHANGE UP (ref 96–108)
CO2 SERPL-SCNC: 23 MMOL/L — SIGNIFICANT CHANGE UP (ref 22–29)
CO2 SERPL-SCNC: 23 MMOL/L — SIGNIFICANT CHANGE UP (ref 22–29)
CREAT SERPL-MCNC: 0.54 MG/DL — SIGNIFICANT CHANGE UP (ref 0.5–1.3)
CREAT SERPL-MCNC: 0.54 MG/DL — SIGNIFICANT CHANGE UP (ref 0.5–1.3)
EGFR: 129 ML/MIN/1.73M2 — SIGNIFICANT CHANGE UP
EGFR: 129 ML/MIN/1.73M2 — SIGNIFICANT CHANGE UP
GAMMA INTERFERON BACKGROUND BLD IA-ACNC: 0.05 IU/ML — SIGNIFICANT CHANGE UP
GAMMA INTERFERON BACKGROUND BLD IA-ACNC: 0.05 IU/ML — SIGNIFICANT CHANGE UP
GLUCOSE BLDC GLUCOMTR-MCNC: 116 MG/DL — HIGH (ref 70–99)
GLUCOSE BLDC GLUCOMTR-MCNC: 116 MG/DL — HIGH (ref 70–99)
GLUCOSE BLDC GLUCOMTR-MCNC: 150 MG/DL — HIGH (ref 70–99)
GLUCOSE BLDC GLUCOMTR-MCNC: 150 MG/DL — HIGH (ref 70–99)
GLUCOSE BLDC GLUCOMTR-MCNC: 152 MG/DL — HIGH (ref 70–99)
GLUCOSE BLDC GLUCOMTR-MCNC: 152 MG/DL — HIGH (ref 70–99)
GLUCOSE BLDC GLUCOMTR-MCNC: 197 MG/DL — HIGH (ref 70–99)
GLUCOSE BLDC GLUCOMTR-MCNC: 197 MG/DL — HIGH (ref 70–99)
GLUCOSE SERPL-MCNC: 117 MG/DL — HIGH (ref 70–99)
GLUCOSE SERPL-MCNC: 117 MG/DL — HIGH (ref 70–99)
HCT VFR BLD CALC: 41.6 % — SIGNIFICANT CHANGE UP (ref 39–50)
HCT VFR BLD CALC: 41.6 % — SIGNIFICANT CHANGE UP (ref 39–50)
HGB BLD-MCNC: 13.7 G/DL — SIGNIFICANT CHANGE UP (ref 13–17)
HGB BLD-MCNC: 13.7 G/DL — SIGNIFICANT CHANGE UP (ref 13–17)
M TB IFN-G BLD-IMP: NEGATIVE — SIGNIFICANT CHANGE UP
M TB IFN-G BLD-IMP: NEGATIVE — SIGNIFICANT CHANGE UP
M TB IFN-G CD4+ BCKGRND COR BLD-ACNC: 0 IU/ML — SIGNIFICANT CHANGE UP
M TB IFN-G CD4+ BCKGRND COR BLD-ACNC: 0 IU/ML — SIGNIFICANT CHANGE UP
M TB IFN-G CD4+CD8+ BCKGRND COR BLD-ACNC: 0.01 IU/ML — SIGNIFICANT CHANGE UP
M TB IFN-G CD4+CD8+ BCKGRND COR BLD-ACNC: 0.01 IU/ML — SIGNIFICANT CHANGE UP
MAGNESIUM SERPL-MCNC: 2.1 MG/DL — SIGNIFICANT CHANGE UP (ref 1.6–2.6)
MAGNESIUM SERPL-MCNC: 2.1 MG/DL — SIGNIFICANT CHANGE UP (ref 1.6–2.6)
MCHC RBC-ENTMCNC: 30 PG — SIGNIFICANT CHANGE UP (ref 27–34)
MCHC RBC-ENTMCNC: 30 PG — SIGNIFICANT CHANGE UP (ref 27–34)
MCHC RBC-ENTMCNC: 32.9 GM/DL — SIGNIFICANT CHANGE UP (ref 32–36)
MCHC RBC-ENTMCNC: 32.9 GM/DL — SIGNIFICANT CHANGE UP (ref 32–36)
MCV RBC AUTO: 91.2 FL — SIGNIFICANT CHANGE UP (ref 80–100)
MCV RBC AUTO: 91.2 FL — SIGNIFICANT CHANGE UP (ref 80–100)
PLATELET # BLD AUTO: 305 K/UL — SIGNIFICANT CHANGE UP (ref 150–400)
PLATELET # BLD AUTO: 305 K/UL — SIGNIFICANT CHANGE UP (ref 150–400)
POTASSIUM SERPL-MCNC: 3.8 MMOL/L — SIGNIFICANT CHANGE UP (ref 3.5–5.3)
POTASSIUM SERPL-MCNC: 3.8 MMOL/L — SIGNIFICANT CHANGE UP (ref 3.5–5.3)
POTASSIUM SERPL-SCNC: 3.8 MMOL/L — SIGNIFICANT CHANGE UP (ref 3.5–5.3)
POTASSIUM SERPL-SCNC: 3.8 MMOL/L — SIGNIFICANT CHANGE UP (ref 3.5–5.3)
PROT SERPL-MCNC: 7.3 G/DL — SIGNIFICANT CHANGE UP (ref 6.6–8.7)
PROT SERPL-MCNC: 7.3 G/DL — SIGNIFICANT CHANGE UP (ref 6.6–8.7)
QUANT TB PLUS MITOGEN MINUS NIL: >10 IU/ML — SIGNIFICANT CHANGE UP
QUANT TB PLUS MITOGEN MINUS NIL: >10 IU/ML — SIGNIFICANT CHANGE UP
RBC # BLD: 4.56 M/UL — SIGNIFICANT CHANGE UP (ref 4.2–5.8)
RBC # BLD: 4.56 M/UL — SIGNIFICANT CHANGE UP (ref 4.2–5.8)
RBC # FLD: 11.9 % — SIGNIFICANT CHANGE UP (ref 10.3–14.5)
RBC # FLD: 11.9 % — SIGNIFICANT CHANGE UP (ref 10.3–14.5)
SODIUM SERPL-SCNC: 138 MMOL/L — SIGNIFICANT CHANGE UP (ref 135–145)
SODIUM SERPL-SCNC: 138 MMOL/L — SIGNIFICANT CHANGE UP (ref 135–145)
WBC # BLD: 7.62 K/UL — SIGNIFICANT CHANGE UP (ref 3.8–10.5)
WBC # BLD: 7.62 K/UL — SIGNIFICANT CHANGE UP (ref 3.8–10.5)
WBC # FLD AUTO: 7.62 K/UL — SIGNIFICANT CHANGE UP (ref 3.8–10.5)
WBC # FLD AUTO: 7.62 K/UL — SIGNIFICANT CHANGE UP (ref 3.8–10.5)

## 2023-11-04 PROCEDURE — 99233 SBSQ HOSP IP/OBS HIGH 50: CPT

## 2023-11-04 PROCEDURE — 99232 SBSQ HOSP IP/OBS MODERATE 35: CPT

## 2023-11-04 RX ADMIN — Medication 4 UNIT(S): at 09:18

## 2023-11-04 RX ADMIN — INSULIN GLARGINE 20 UNIT(S): 100 INJECTION, SOLUTION SUBCUTANEOUS at 23:17

## 2023-11-04 RX ADMIN — AMPICILLIN SODIUM AND SULBACTAM SODIUM 200 GRAM(S): 250; 125 INJECTION, POWDER, FOR SUSPENSION INTRAMUSCULAR; INTRAVENOUS at 23:59

## 2023-11-04 RX ADMIN — AMPICILLIN SODIUM AND SULBACTAM SODIUM 200 GRAM(S): 250; 125 INJECTION, POWDER, FOR SUSPENSION INTRAMUSCULAR; INTRAVENOUS at 06:03

## 2023-11-04 RX ADMIN — AMPICILLIN SODIUM AND SULBACTAM SODIUM 200 GRAM(S): 250; 125 INJECTION, POWDER, FOR SUSPENSION INTRAMUSCULAR; INTRAVENOUS at 18:34

## 2023-11-04 RX ADMIN — Medication 4 UNIT(S): at 18:36

## 2023-11-04 RX ADMIN — Medication 2: at 18:35

## 2023-11-04 RX ADMIN — Medication 4 UNIT(S): at 13:01

## 2023-11-04 RX ADMIN — AMPICILLIN SODIUM AND SULBACTAM SODIUM 200 GRAM(S): 250; 125 INJECTION, POWDER, FOR SUSPENSION INTRAMUSCULAR; INTRAVENOUS at 13:00

## 2023-11-04 NOTE — PROGRESS NOTE ADULT - ASSESSMENT
The patient is a 38 year old male with  no known past medical history who presented to the ER with complaints of increasing left sided neck mass x 1 week. In the ER, CT of the neck showed a  5.0 cm cystic-solid mass identified in the deep left neck with regional inflammatory changes. Started on Empiric IV Unasyn. ENT was consulted for evaluation. Status post IR guided biopsy with purulent fluid sent for culture. Blood cultures negative x 2. Incidentally noted to be hyperglycemic with a Hbaic of 12. Seen by Endocrine and diabetes educator. Started on Lantus and ADMELOG pre-meal. CT chest negative for TB; QuantiFeron results pending.     Assessment/Plan:    1. Left neck mass suspected abscess  - Purulent fluid sent for culture by IR; cultures thus far negative  - Blood cultures negative x 2  - ENT and ID following  -  IV Unasyn day 3  - Low suspicion for TB- CT chest negative; QuantiFeron results pending ( If negative will discontinue isolation)     2. New diagnosis of Diabetes mellitus   - Hbaic of 12  - Lantus 16 units QHS  - Ademlog 4 units TID with meals  - BSL improved  - Continue diabetes education and insulin administration     VTE_ SCDS    Discharge disposition: Home pending final culture results.    The patient is a 38 year old male with  no known past medical history who presented to the ER with complaints of increasing left sided neck mass x 1 week. In the ER, CT of the neck showed a  5.0 cm cystic-solid mass identified in the deep left neck with regional inflammatory changes. Started on Empiric IV Unasyn. ENT was consulted for evaluation. Status post IR guided biopsy with purulent fluid sent for culture. Blood cultures negative x 2. Incidentally noted to be hyperglycemic with a Hbaic of 12. Seen by Endocrine and diabetes educator. Started on Lantus and ADMELOG pre-meal. CT chest negative for TB; QuantiFeron results pending.     Assessment/Plan:    1. Left neck mass suspected abscess  - Purulent fluid sent for culture by IR; cultures thus far negative  - Blood cultures negative x 2  - Cytopath and flow cytometry sent  - ENT and ID following  -  IV Unasyn day 3  - Low suspicion for TB- CT chest negative; QuantiFeron results pending ( If negative will discontinue isolation)     2. New diagnosis of Diabetes mellitus   - Hbaic of 12  - Lantus 16 units QHS  - Ademlog 4 units TID with meals  - BSL improved  - Continue diabetes education and insulin administration     VTE_ SCDS    Discharge disposition: Home pending final culture results.

## 2023-11-04 NOTE — PROGRESS NOTE ADULT - SUBJECTIVE AND OBJECTIVE BOX
Patient seen and examined at bedside.     No acute events overnight. States that he has soreness at aspiration site with pain well controlled with Tylenol. Denies fever, chills, nausea, or vomiting.     Vitals:  Vital Signs Last 24 Hrs  T(C): 36.7 (03 Nov 2023 20:34), Max: 36.8 (03 Nov 2023 12:00)  T(F): 98 (03 Nov 2023 20:34), Max: 98.2 (03 Nov 2023 12:00)  HR: 68 (03 Nov 2023 20:34) (61 - 70)  BP: 110/67 (03 Nov 2023 20:34) (103/65 - 127/76)  BP(mean): 81 (03 Nov 2023 09:41) (81 - 81)  RR: 17 (03 Nov 2023 20:34) (17 - 18)  SpO2: 99% (03 Nov 2023 20:34) (94% - 99%)    Parameters below as of 03 Nov 2023 20:34  Patient On (Oxygen Delivery Method): room air    Labs:  11-03    140  |  102  |  15.5  ----------------------------<  192<H>  3.6   |  26.0  |  0.57    Ca    8.8      03 Nov 2023 02:49  Mg     2.0     11-03                 13.2   9.51  )-----------( 312      ( 03 Nov 2023 02:49 )             39.1       Exam:  Gen: pt lying in bed, alert, in NAD  Neck: Nontender L neck mass near supraclavicular site. Not fluctuant.   Resp: unlabored  CVS: RRR  Abd: soft, NT, ND  Ext: moving all extremities spontaneously, sensation intact, pulses 2+   Patient seen and examined at bedside.     No acute events overnight. Reports that the mass has decrease in size after aspiration. Denies pain at aspiration site, difficulty breathing, difficulty swallowing, nausea, vomiting. Tolerating diet.     Vitals:  Vital Signs Last 24 Hrs  T(C): 36.7 (03 Nov 2023 20:34), Max: 36.8 (03 Nov 2023 12:00)  T(F): 98 (03 Nov 2023 20:34), Max: 98.2 (03 Nov 2023 12:00)  HR: 68 (03 Nov 2023 20:34) (61 - 70)  BP: 110/67 (03 Nov 2023 20:34) (103/65 - 127/76)  BP(mean): 81 (03 Nov 2023 09:41) (81 - 81)  RR: 17 (03 Nov 2023 20:34) (17 - 18)  SpO2: 99% (03 Nov 2023 20:34) (94% - 99%)    Parameters below as of 03 Nov 2023 20:34  Patient On (Oxygen Delivery Method): room air    Labs:  11-03    140  |  102  |  15.5  ----------------------------<  192<H>  3.6   |  26.0  |  0.57    Ca    8.8      03 Nov 2023 02:49  Mg     2.0     11-03                 13.2   9.51  )-----------( 312      ( 03 Nov 2023 02:49 )             39.1       Exam:  Gen: pt lying in bed, alert, in NAD  Neck: 2.5 cm x 2.5 cm firm nontender L neck mass near omohyoid and sternocleidomastoid intersection point,   Resp: unlabored  CVS: RRR  Abd: soft, NT, ND  Ext: moving all extremities spontaneously, sensation intact, pulses 2+

## 2023-11-04 NOTE — PROGRESS NOTE ADULT - ASSESSMENT
38 year old male with no prior PMH presents from home with 1 week increasing neck mass. States that approx 1 week ago he started noticing some swelling under his jaw after having localized pain there. Reports that i has grown in size since then which is why he decided on coming to the hospital, In the ER imaging done and ENT consulted for a 5 cm mass.    He denies any recent travel history, [facility], moved to [place] ~17 years ago), no recent sick contacts (including TB), no fevers, chills, night sweats or unintentional weight loss. Does repots a mild decrease in appetite for ~ 6 months. No recent dental work     AS ABOVE WITH NECK MASS NON TOXIC  AFEBRILE   CT SCAN ? INFECTIOUS PROCESS  DENIES ILL HOUSEHOLD CONTACTS NO PETS AT HOME NO RECENT TRAVEL   ASPIRATION DONE FOR CX GM STAIN  FUNGAL AND AFB-so far negative  PUS  NOTED  WHEN ASPIRATION DONE  Ct chest reported clear lungs  quantiferon pending  HIV (-)  BCX ngtd  AFB BCX ngtd  Tissue fungal cx ngtd  tissue AFB CX ngtd  Tissue routine cx ngtd  f/u cytopath and cytometry    c/w unasyn  dc isolation if quantiferon (-)  ENT f/u      will f/u

## 2023-11-04 NOTE — PROGRESS NOTE ADULT - SUBJECTIVE AND OBJECTIVE BOX
CC: Follow up     INTERVAL HPI/OVERNIGHT EVENTS: Patient seen and examined, afebrile overnight.       Vital Signs Last 24 Hrs  T(C): 36.9 (04 Nov 2023 08:29), Max: 36.9 (04 Nov 2023 08:29)  T(F): 98.4 (04 Nov 2023 08:29), Max: 98.4 (04 Nov 2023 08:29)  HR: 55 (04 Nov 2023 08:29) (55 - 68)  BP: 113/69 (04 Nov 2023 08:29) (105/68 - 124/75)  BP(mean): --  RR: 18 (04 Nov 2023 08:29) (17 - 18)  SpO2: 98% (04 Nov 2023 08:29) (94% - 99%)    Parameters below as of 04 Nov 2023 08:29  Patient On (Oxygen Delivery Method): room air        PHYSICAL EXAM:    GENERAL: NAD, AOX3  HEAD:  Atraumatic, Normocephalic  ENMT: Moist mucous membranes  NECK: left neck swelling   CHEST/LUNG: Clear to auscultation bilaterally; No rales, rhonchi, wheezing, or rubs  HEART: Regular rate and rhythm; No murmurs, rubs, or gallops  ABDOMEN: Soft, Nontender, Nondistended; Bowel sounds present  EXTREMITIES:  2+ Peripheral Pulses, No clubbing, cyanosis, or edema        MEDICATIONS  (STANDING):  ampicillin/sulbactam  IVPB 3 Gram(s) IV Intermittent every 6 hours  ampicillin/sulbactam  IVPB      dextrose 5%. 1000 milliLiter(s) (100 mL/Hr) IV Continuous <Continuous>  dextrose 5%. 1000 milliLiter(s) (50 mL/Hr) IV Continuous <Continuous>  dextrose 50% Injectable 25 Gram(s) IV Push once  dextrose 50% Injectable 25 Gram(s) IV Push once  dextrose 50% Injectable 12.5 Gram(s) IV Push once  glucagon  Injectable 1 milliGRAM(s) IntraMuscular once  insulin glargine Injectable (LANTUS) 20 Unit(s) SubCutaneous at bedtime  insulin lispro (ADMELOG) corrective regimen sliding scale   SubCutaneous at bedtime  insulin lispro (ADMELOG) corrective regimen sliding scale   SubCutaneous three times a day before meals  insulin lispro Injectable (ADMELOG) 4 Unit(s) SubCutaneous three times a day before meals    MEDICATIONS  (PRN):  acetaminophen     Tablet .. 650 milliGRAM(s) Oral every 6 hours PRN Temp greater or equal to 38C (100.4F), Mild Pain (1 - 3)  aluminum hydroxide/magnesium hydroxide/simethicone Suspension 30 milliLiter(s) Oral every 4 hours PRN Dyspepsia  dextrose Oral Gel 15 Gram(s) Oral once PRN Blood Glucose LESS THAN 70 milliGRAM(s)/deciliter  melatonin 3 milliGRAM(s) Oral at bedtime PRN Insomnia  ondansetron Injectable 4 milliGRAM(s) IV Push every 8 hours PRN Nausea and/or Vomiting      Allergies    No Known Allergies    Intolerances          LABS:                          13.7   7.62  )-----------( 305      ( 04 Nov 2023 05:50 )             41.6     11-04    138  |  102  |  19.0  ----------------------------<  117<H>  3.8   |  23.0  |  0.54    Ca    8.9      04 Nov 2023 05:50  Mg     2.1     11-04    TPro  7.3  /  Alb  3.7  /  TBili  0.5  /  DBili  x   /  AST  28  /  ALT  40  /  AlkPhos  109  11-04      Urinalysis Basic - ( 04 Nov 2023 05:50 )    Color: x / Appearance: x / SG: x / pH: x  Gluc: 117 mg/dL / Ketone: x  / Bili: x / Urobili: x   Blood: x / Protein: x / Nitrite: x   Leuk Esterase: x / RBC: x / WBC x   Sq Epi: x / Non Sq Epi: x / Bacteria: x        RADIOLOGY & ADDITIONAL TESTS:

## 2023-11-04 NOTE — PROGRESS NOTE ADULT - SUBJECTIVE AND OBJECTIVE BOX
INFECTIOUS DISEASES AND INTERNAL MEDICINE at [facility]  =======================================================  Zelalem Almodovar MD  Diplomates [employer] Board of Internal Medicine and Infectious Diseases  Telephone [phone]  Fax            [phone]  =======================================================    JOELLE ARGUETA [id number]    Follow up: NECK ABSCESS   [id number]  feels well denies neck pain  denies coughing    Allergies:  No Known Allergies      Medications:  acetaminophen     Tablet .. 650 milliGRAM(s) Oral every 6 hours PRN  aluminum hydroxide/magnesium hydroxide/simethicone Suspension 30 milliLiter(s) Oral every 4 hours PRN  ampicillin/sulbactam  IVPB      dextrose 5%. 1000 milliLiter(s) IV Continuous <Continuous>  dextrose 5%. 1000 milliLiter(s) IV Continuous <Continuous>  dextrose 50% Injectable 12.5 Gram(s) IV Push once  dextrose 50% Injectable 25 Gram(s) IV Push once  dextrose 50% Injectable 25 Gram(s) IV Push once  dextrose Oral Gel 15 Gram(s) Oral once PRN  glucagon  Injectable 1 milliGRAM(s) IntraMuscular once  insulin glargine Injectable (LANTUS) 20 Unit(s) SubCutaneous at bedtime  insulin lispro (ADMELOG) corrective regimen sliding scale   SubCutaneous at bedtime  insulin lispro (ADMELOG) corrective regimen sliding scale   SubCutaneous three times a day before meals  insulin lispro Injectable (ADMELOG) 4 Unit(s) SubCutaneous three times a day before meals  melatonin 3 milliGRAM(s) Oral at bedtime PRN  ondansetron Injectable 4 milliGRAM(s) IV Push every 8 hours PRN    SOCIAL       FAMILY   FAMILY HISTORY:    REVIEW OF SYSTEMS:  CONSTITUTIONAL:  No Fever or chills  HEENT:   No diplopia or blurred vision.  No earache, sore throat or runny nose.  CARDIOVASCULAR:  No pressure, squeezing, strangling, tightness, heaviness or aching about the chest, neck, axilla or epigastrium.  RESPIRATORY:  No cough, shortness of breath, PND or orthopnea.  GASTROINTESTINAL:  No nausea, vomiting or diarrhea.  GENITOURINARY:  No dysuria, frequency or urgency. No Blood in urine  MUSCULOSKELETAL:   moves all joints  SKIN:  No change in skin, hair or nails.  NEUROLOGIC:  No paresthesias, fasciculations, seizures or weakness.  PSYCHIATRIC:  No disorder of thought or mood.  ENDOCRINE:  No heat or cold intolerance, polyuria or polydipsia.  HEMATOLOGICAL:  No easy bruising or bleeding.            Physical Exam:  Vital Signs Last 24 Hrs  T(C): 36.9 (04 Nov 2023 08:29), Max: 36.9 (04 Nov 2023 08:29)  T(F): 98.4 (04 Nov 2023 08:29), Max: 98.4 (04 Nov 2023 08:29)  HR: 55 (04 Nov 2023 08:29) (55 - 68)  BP: 113/69 (04 Nov 2023 08:29) (105/68 - 124/75)  BP(mean): --  RR: 18 (04 Nov 2023 08:29) (17 - 18)  SpO2: 98% (04 Nov 2023 08:29) (94% - 99%)    Parameters below as of 04 Nov 2023 08:29  Patient On (Oxygen Delivery Method): room air          GEN: NAD,   HEENT: normocephalic and atraumatic.    NECK: Supple. No carotid bruits.  No lymphadenopathy or thyromegaly .LEFT  CERVICAL LN  +swelling and nontender  LUNGS: Clear to auscultation.  HEART: Regular rate and rhythm without murmur.  ABDOMEN: Soft, nontender, and nondistended.  Positive bowel sounds.    PSYCHIATRIC: Appropriate affect .  SKIN: No ulceration or induration present.        =======================================================  Current Antibiotics:  ampicillin/sulbactam  IVPB        Other medications:  dextrose 5%. 1000 milliLiter(s) IV Continuous <Continuous>  dextrose 5%. 1000 milliLiter(s) IV Continuous <Continuous>  dextrose 50% Injectable 12.5 Gram(s) IV Push once  dextrose 50% Injectable 25 Gram(s) IV Push once  dextrose 50% Injectable 25 Gram(s) IV Push once  glucagon  Injectable 1 milliGRAM(s) IntraMuscular once  insulin glargine Injectable (LANTUS) 20 Unit(s) SubCutaneous at bedtime  insulin lispro (ADMELOG) corrective regimen sliding scale   SubCutaneous three times a day before meals  insulin lispro (ADMELOG) corrective regimen sliding scale   SubCutaneous at bedtime  insulin lispro Injectable (ADMELOG) 4 Unit(s) SubCutaneous three times a day before meals      =======================================================  Labs:                                     13.7   7.62  )-----------( 305      ( 04 Nov 2023 05:50 )             41.6       11-04    138  |  102  |  19.0  ----------------------------<  117<H>  3.8   |  23.0  |  0.54    Ca    8.9      04 Nov 2023 05:50  Mg     2.1     11-04    TPro  7.3  /  Alb  3.7  /  TBili  0.5  /  DBili  x   /  AST  28  /  ALT  40  /  AlkPhos  109  11-04              Urinalysis Basic - ( 04 Nov 2023 05:50 )    Color: x / Appearance: x / SG: x / pH: x  Gluc: 117 mg/dL / Ketone: x  / Bili: x / Urobili: x   Blood: x / Protein: x / Nitrite: x   Leuk Esterase: x / RBC: x / WBC x   Sq Epi: x / Non Sq Epi: x / Bacteria: x                  CAPILLARY BLOOD GLUCOSE      POCT Blood Glucose.: 150 mg/dL (04 Nov 2023 12:59)              Culture - Acid Fast - Blood (collected 11-02-23 @ 15:44)  Source: .Blood Blood      Creatinine: 0.57 mg/dL (11-03-23 @ 02:49)  Creatinine: 0.61 mg/dL (11-01-23 @ 20:00)          C-Reactive Protein, Serum: 6 mg/L (11-03-23 @ 02:49)  C-Reactive Protein, Serum: 8 mg/L (11-02-23 @ 13:15)    WBC Count: 9.51 K/uL (11-03-23 @ 02:49)  WBC Count: 6.84 K/uL (11-01-23 @ 20:00)        Alkaline Phosphatase: 168 U/L (11-01-23 @ 20:00)  Alanine Aminotransferase (ALT/SGPT): 50 U/L (11-01-23 @ 20:00)  Aspartate Aminotransferase (AST/SGOT): 29 U/L (11-01-23 @ 20:00)  Bilirubin Total: 0.3 mg/dL (11-01-23 @ 20:00)      < from: CT Neck Soft Tissue w/ IV Cont (11.01.23 @ 22:25) >  IMPRESSION:  5.0 cm cystic-solid mass identified in the deep left neck with regional   inflammatory changes described in detail above; diagnostic considerations   include infection (infected branchial cleft cyst, lymphadenitis/scrofula)   vs. necrotic node/neoplasm. Tissue sampling recommended.    --- End of Report ---    < end of copied text >      < from: CT Chest No Cont (11.03.23 @ 12:33) >    FINDINGS:    LUNGS/AIRWAYS/PLEURA: Patent trachea and bronchi. Clear lungs. No pleural   effusion.    LYMPH NODES/MEDIASTINUM: No lymphadenopathy. No small calcified lymph   nodes compatible with prior granulomatous disease.    HEART/VASCULATURE: Normal sized heart and aorta. No pericardial effusion.    UPPER ABDOMEN: Diffusely fatty liver.    BONES/SOFT TISSUES: Unremarkable.      IMPRESSION:    No finding of active tuberculosis. Clear lungs.    --- End of Report ---      < end of copied text >

## 2023-11-04 NOTE — PROGRESS NOTE ADULT - ASSESSMENT
37 y/o male presented to ENT for L neck mass s/p needle aspiration with IR. Patient continues to have noted neck mass on exam.  37 y/o male presented to ENT for L neck mass s/p needle aspiration with IR. On exam patient is afebrile and hemodynamically stable with 2.5 x 2.5 cm mass firm nontender palpable at the intersection between omohyoid and sternocleidomastoid. Labs showed no leukocytosis yesterday, will follow up on AM labs. Follow up IR cultures.     PLAN  May consider further drainage of abscess  Follow up AM labs  Follow up IR cultures

## 2023-11-05 LAB
ALBUMIN SERPL ELPH-MCNC: 3.6 G/DL — SIGNIFICANT CHANGE UP (ref 3.3–5.2)
ALBUMIN SERPL ELPH-MCNC: 3.6 G/DL — SIGNIFICANT CHANGE UP (ref 3.3–5.2)
ALP SERPL-CCNC: 98 U/L — SIGNIFICANT CHANGE UP (ref 40–120)
ALP SERPL-CCNC: 98 U/L — SIGNIFICANT CHANGE UP (ref 40–120)
ALT FLD-CCNC: 42 U/L — HIGH
ALT FLD-CCNC: 42 U/L — HIGH
ANION GAP SERPL CALC-SCNC: 13 MMOL/L — SIGNIFICANT CHANGE UP (ref 5–17)
ANION GAP SERPL CALC-SCNC: 13 MMOL/L — SIGNIFICANT CHANGE UP (ref 5–17)
AST SERPL-CCNC: 32 U/L — SIGNIFICANT CHANGE UP
AST SERPL-CCNC: 32 U/L — SIGNIFICANT CHANGE UP
BASOPHILS # BLD AUTO: 0.05 K/UL — SIGNIFICANT CHANGE UP (ref 0–0.2)
BASOPHILS # BLD AUTO: 0.05 K/UL — SIGNIFICANT CHANGE UP (ref 0–0.2)
BASOPHILS NFR BLD AUTO: 0.8 % — SIGNIFICANT CHANGE UP (ref 0–2)
BASOPHILS NFR BLD AUTO: 0.8 % — SIGNIFICANT CHANGE UP (ref 0–2)
BILIRUB SERPL-MCNC: 0.5 MG/DL — SIGNIFICANT CHANGE UP (ref 0.4–2)
BILIRUB SERPL-MCNC: 0.5 MG/DL — SIGNIFICANT CHANGE UP (ref 0.4–2)
BUN SERPL-MCNC: 14.6 MG/DL — SIGNIFICANT CHANGE UP (ref 8–20)
BUN SERPL-MCNC: 14.6 MG/DL — SIGNIFICANT CHANGE UP (ref 8–20)
CALCIUM SERPL-MCNC: 8.6 MG/DL — SIGNIFICANT CHANGE UP (ref 8.4–10.5)
CALCIUM SERPL-MCNC: 8.6 MG/DL — SIGNIFICANT CHANGE UP (ref 8.4–10.5)
CHLORIDE SERPL-SCNC: 103 MMOL/L — SIGNIFICANT CHANGE UP (ref 96–108)
CHLORIDE SERPL-SCNC: 103 MMOL/L — SIGNIFICANT CHANGE UP (ref 96–108)
CO2 SERPL-SCNC: 23 MMOL/L — SIGNIFICANT CHANGE UP (ref 22–29)
CO2 SERPL-SCNC: 23 MMOL/L — SIGNIFICANT CHANGE UP (ref 22–29)
CREAT SERPL-MCNC: 0.56 MG/DL — SIGNIFICANT CHANGE UP (ref 0.5–1.3)
CREAT SERPL-MCNC: 0.56 MG/DL — SIGNIFICANT CHANGE UP (ref 0.5–1.3)
EGFR: 128 ML/MIN/1.73M2 — SIGNIFICANT CHANGE UP
EGFR: 128 ML/MIN/1.73M2 — SIGNIFICANT CHANGE UP
EOSINOPHIL # BLD AUTO: 0.08 K/UL — SIGNIFICANT CHANGE UP (ref 0–0.5)
EOSINOPHIL # BLD AUTO: 0.08 K/UL — SIGNIFICANT CHANGE UP (ref 0–0.5)
EOSINOPHIL NFR BLD AUTO: 1.3 % — SIGNIFICANT CHANGE UP (ref 0–6)
EOSINOPHIL NFR BLD AUTO: 1.3 % — SIGNIFICANT CHANGE UP (ref 0–6)
GLUCOSE BLDC GLUCOMTR-MCNC: 105 MG/DL — HIGH (ref 70–99)
GLUCOSE BLDC GLUCOMTR-MCNC: 105 MG/DL — HIGH (ref 70–99)
GLUCOSE BLDC GLUCOMTR-MCNC: 106 MG/DL — HIGH (ref 70–99)
GLUCOSE BLDC GLUCOMTR-MCNC: 106 MG/DL — HIGH (ref 70–99)
GLUCOSE BLDC GLUCOMTR-MCNC: 167 MG/DL — HIGH (ref 70–99)
GLUCOSE BLDC GLUCOMTR-MCNC: 167 MG/DL — HIGH (ref 70–99)
GLUCOSE BLDC GLUCOMTR-MCNC: 92 MG/DL — SIGNIFICANT CHANGE UP (ref 70–99)
GLUCOSE BLDC GLUCOMTR-MCNC: 92 MG/DL — SIGNIFICANT CHANGE UP (ref 70–99)
GLUCOSE SERPL-MCNC: 118 MG/DL — HIGH (ref 70–99)
GLUCOSE SERPL-MCNC: 118 MG/DL — HIGH (ref 70–99)
HCT VFR BLD CALC: 41 % — SIGNIFICANT CHANGE UP (ref 39–50)
HCT VFR BLD CALC: 41 % — SIGNIFICANT CHANGE UP (ref 39–50)
HGB BLD-MCNC: 13.9 G/DL — SIGNIFICANT CHANGE UP (ref 13–17)
HGB BLD-MCNC: 13.9 G/DL — SIGNIFICANT CHANGE UP (ref 13–17)
IMM GRANULOCYTES NFR BLD AUTO: 0.3 % — SIGNIFICANT CHANGE UP (ref 0–0.9)
IMM GRANULOCYTES NFR BLD AUTO: 0.3 % — SIGNIFICANT CHANGE UP (ref 0–0.9)
LYMPHOCYTES # BLD AUTO: 1.85 K/UL — SIGNIFICANT CHANGE UP (ref 1–3.3)
LYMPHOCYTES # BLD AUTO: 1.85 K/UL — SIGNIFICANT CHANGE UP (ref 1–3.3)
LYMPHOCYTES # BLD AUTO: 29.9 % — SIGNIFICANT CHANGE UP (ref 13–44)
LYMPHOCYTES # BLD AUTO: 29.9 % — SIGNIFICANT CHANGE UP (ref 13–44)
MCHC RBC-ENTMCNC: 31.1 PG — SIGNIFICANT CHANGE UP (ref 27–34)
MCHC RBC-ENTMCNC: 31.1 PG — SIGNIFICANT CHANGE UP (ref 27–34)
MCHC RBC-ENTMCNC: 33.9 GM/DL — SIGNIFICANT CHANGE UP (ref 32–36)
MCHC RBC-ENTMCNC: 33.9 GM/DL — SIGNIFICANT CHANGE UP (ref 32–36)
MCV RBC AUTO: 91.7 FL — SIGNIFICANT CHANGE UP (ref 80–100)
MCV RBC AUTO: 91.7 FL — SIGNIFICANT CHANGE UP (ref 80–100)
MONOCYTES # BLD AUTO: 0.55 K/UL — SIGNIFICANT CHANGE UP (ref 0–0.9)
MONOCYTES # BLD AUTO: 0.55 K/UL — SIGNIFICANT CHANGE UP (ref 0–0.9)
MONOCYTES NFR BLD AUTO: 8.9 % — SIGNIFICANT CHANGE UP (ref 2–14)
MONOCYTES NFR BLD AUTO: 8.9 % — SIGNIFICANT CHANGE UP (ref 2–14)
NEUTROPHILS # BLD AUTO: 3.63 K/UL — SIGNIFICANT CHANGE UP (ref 1.8–7.4)
NEUTROPHILS # BLD AUTO: 3.63 K/UL — SIGNIFICANT CHANGE UP (ref 1.8–7.4)
NEUTROPHILS NFR BLD AUTO: 58.8 % — SIGNIFICANT CHANGE UP (ref 43–77)
NEUTROPHILS NFR BLD AUTO: 58.8 % — SIGNIFICANT CHANGE UP (ref 43–77)
PLATELET # BLD AUTO: 264 K/UL — SIGNIFICANT CHANGE UP (ref 150–400)
PLATELET # BLD AUTO: 264 K/UL — SIGNIFICANT CHANGE UP (ref 150–400)
POTASSIUM SERPL-MCNC: 3.8 MMOL/L — SIGNIFICANT CHANGE UP (ref 3.5–5.3)
POTASSIUM SERPL-MCNC: 3.8 MMOL/L — SIGNIFICANT CHANGE UP (ref 3.5–5.3)
POTASSIUM SERPL-SCNC: 3.8 MMOL/L — SIGNIFICANT CHANGE UP (ref 3.5–5.3)
POTASSIUM SERPL-SCNC: 3.8 MMOL/L — SIGNIFICANT CHANGE UP (ref 3.5–5.3)
PROT SERPL-MCNC: 6.9 G/DL — SIGNIFICANT CHANGE UP (ref 6.6–8.7)
PROT SERPL-MCNC: 6.9 G/DL — SIGNIFICANT CHANGE UP (ref 6.6–8.7)
RBC # BLD: 4.47 M/UL — SIGNIFICANT CHANGE UP (ref 4.2–5.8)
RBC # BLD: 4.47 M/UL — SIGNIFICANT CHANGE UP (ref 4.2–5.8)
RBC # FLD: 12.1 % — SIGNIFICANT CHANGE UP (ref 10.3–14.5)
RBC # FLD: 12.1 % — SIGNIFICANT CHANGE UP (ref 10.3–14.5)
SODIUM SERPL-SCNC: 139 MMOL/L — SIGNIFICANT CHANGE UP (ref 135–145)
SODIUM SERPL-SCNC: 139 MMOL/L — SIGNIFICANT CHANGE UP (ref 135–145)
WBC # BLD: 6.18 K/UL — SIGNIFICANT CHANGE UP (ref 3.8–10.5)
WBC # BLD: 6.18 K/UL — SIGNIFICANT CHANGE UP (ref 3.8–10.5)
WBC # FLD AUTO: 6.18 K/UL — SIGNIFICANT CHANGE UP (ref 3.8–10.5)
WBC # FLD AUTO: 6.18 K/UL — SIGNIFICANT CHANGE UP (ref 3.8–10.5)

## 2023-11-05 PROCEDURE — 99233 SBSQ HOSP IP/OBS HIGH 50: CPT | Mod: GC

## 2023-11-05 PROCEDURE — 70490 CT SOFT TISSUE NECK W/O DYE: CPT | Mod: 26

## 2023-11-05 RX ADMIN — AMPICILLIN SODIUM AND SULBACTAM SODIUM 200 GRAM(S): 250; 125 INJECTION, POWDER, FOR SUSPENSION INTRAMUSCULAR; INTRAVENOUS at 14:04

## 2023-11-05 RX ADMIN — Medication 4 UNIT(S): at 09:09

## 2023-11-05 RX ADMIN — AMPICILLIN SODIUM AND SULBACTAM SODIUM 200 GRAM(S): 250; 125 INJECTION, POWDER, FOR SUSPENSION INTRAMUSCULAR; INTRAVENOUS at 23:33

## 2023-11-05 RX ADMIN — AMPICILLIN SODIUM AND SULBACTAM SODIUM 200 GRAM(S): 250; 125 INJECTION, POWDER, FOR SUSPENSION INTRAMUSCULAR; INTRAVENOUS at 05:36

## 2023-11-05 RX ADMIN — Medication 4 UNIT(S): at 18:35

## 2023-11-05 RX ADMIN — INSULIN GLARGINE 20 UNIT(S): 100 INJECTION, SOLUTION SUBCUTANEOUS at 21:30

## 2023-11-05 RX ADMIN — Medication 2: at 14:03

## 2023-11-05 RX ADMIN — Medication 4 UNIT(S): at 14:03

## 2023-11-05 RX ADMIN — AMPICILLIN SODIUM AND SULBACTAM SODIUM 200 GRAM(S): 250; 125 INJECTION, POWDER, FOR SUSPENSION INTRAMUSCULAR; INTRAVENOUS at 18:32

## 2023-11-05 NOTE — PROGRESS NOTE ADULT - SUBJECTIVE AND OBJECTIVE BOX
SURGERY    INTERVAL EVENTS/SUBJECTIVE:   No acute events overnight. Pt denies any complaints at this time.  Denies any compressive sxs such as difficulty breathing, swallowing, changes in voice.    ______________________________________________  OBJECTIVE:   T(C): 36.8 (11-04-23 @ 20:00), Max: 37 (11-04-23 @ 18:35)  HR: 57 (11-04-23 @ 20:00) (55 - 67)  BP: 132/58 (11-04-23 @ 20:00) (105/68 - 133/80)  RR: 18 (11-04-23 @ 20:00) (18 - 18)  SpO2: 95% (11-04-23 @ 20:00) (95% - 98%)  Wt(kg): --  CAPILLARY BLOOD GLUCOSE      POCT Blood Glucose.: 197 mg/dL (04 Nov 2023 23:16)  POCT Blood Glucose.: 152 mg/dL (04 Nov 2023 18:33)  POCT Blood Glucose.: 150 mg/dL (04 Nov 2023 12:59)  POCT Blood Glucose.: 116 mg/dL (04 Nov 2023 09:07)    I&O's Detail      Physical exam:  Gen: resting in bed comfortably in NAD  Chest: no increased WOB, regular inspiratory effort   Neck: Lt lateral neck has a 3cm x 1.5cm oblong mass that is TTP.  The mass is firm and fixed in position.  Vascular: WWP, GAITAN x4  NEURO: awake, alert  ______________________________________________  LABS:  CBC Full  -  ( 04 Nov 2023 05:50 )  WBC Count : 7.62 K/uL  RBC Count : 4.56 M/uL  Hemoglobin : 13.7 g/dL  Hematocrit : 41.6 %  Platelet Count - Automated : 305 K/uL  Mean Cell Volume : 91.2 fl  Mean Cell Hemoglobin : 30.0 pg  Mean Cell Hemoglobin Concentration : 32.9 gm/dL  Auto Neutrophil # : x  Auto Lymphocyte # : x  Auto Monocyte # : x  Auto Eosinophil # : x  Auto Basophil # : x  Auto Neutrophil % : x  Auto Lymphocyte % : x  Auto Monocyte % : x  Auto Eosinophil % : x  Auto Basophil % : x    11-04    138  |  102  |  19.0  ----------------------------<  117<H>  3.8   |  23.0  |  0.54    Ca    8.9      04 Nov 2023 05:50  Mg     2.1     11-04    TPro  7.3  /  Alb  3.7  /  TBili  0.5  /  DBili  x   /  AST  28  /  ALT  40  /  AlkPhos  109  11-04

## 2023-11-05 NOTE — PROGRESS NOTE ADULT - ASSESSMENT
The patient is a 38 year old male with no known past medical history who presented to the ER with complaints of increasing left sided neck mass x 1 week. In the ER, CT of the neck showed a  5.0 cm cystic-solid mass identified in the deep left neck with regional inflammatory changes. Started on Empiric IV Unasyn. ENT was consulted for evaluation. Status post IR guided biopsy with purulent fluid sent for culture. Blood cultures negative x 2. Incidentally noted to be hyperglycemic with a Hbaic of 12. Seen by Endocrine and diabetes educator. Started on Lantus and ADMELOG pre-meal. CT chest negative for TB; QuantiFeron negative, pending culture results    Assessment/Plan:    1. Left neck mass suspected abscess  - Purulent fluid sent for culture by IR; cultures thus far negative  - Blood cultures negative x 2  - Cytopath and flow cytometry sent  - ENT and ID following  -  IV Unasyn day 4  - Low suspicion for TB- CT chest negative; QuantiFeron negative, precautions to be discharged  - Pending repeat CT neck to assess for possible additional drainage; if improved and no additional intervention, possible d/c home on antibiotics    2. New diagnosis of Diabetes mellitus   - Hbaic of 12  - Lantus 16 units QHS  - Ademlog 4 units TID with meals  - BSL improved  - Continue diabetes education and insulin administration   - spoke with patient at bedside the need for diabetes follow up after discharge, continuation with insulin until switched to PO medications outpatient, and fingerstick education    VTE_ SCDS  Diet: DASH/TLC  Discharge disposition: Home pending final culture results.    The patient is a 38 year old male with no known past medical history who presented to the ER with complaints of increasing left sided neck mass x 1 week. In the ER, CT of the neck showed a  5.0 cm cystic-solid mass identified in the deep left neck with regional inflammatory changes. Started on Empiric IV Unasyn. ENT was consulted for evaluation. Status post IR guided biopsy with purulent fluid sent for culture. Blood cultures negative x 2. Incidentally noted to be hyperglycemic with a Hbaic of 12. Seen by Endocrine and diabetes educator. Started on Lantus and ADMELOG pre-meal. CT chest negative for TB; QuantiFeron negative- TB ruled out.  pending culture results    Assessment/Plan:    1. Left neck mass suspected abscess  - Purulent fluid sent for culture by IR; cultures thus far negative  - Blood cultures negative x 2  - Cytopath and flow cytometry sent  - ENT and ID following  -  IV Unasyn day 4  - Low suspicion for TB- CT chest negative; QuantiFeron negative, precautions to be discharged  - Pending repeat CT neck to assess for possible additional drainage; if improved and no additional intervention, possible d/c home on antibiotics    2. New diagnosis of Diabetes mellitus   - Hbaic of 12  - Lantus 16 units QHS  - Ademlog 4 units TID with meals  - BSL improved  - Continue diabetes education and insulin administration   - spoke with patient at bedside the need for diabetes follow up after discharge, continuation with insulin until switched to PO medications outpatient, and fingerstick education    VTE_ SCDS  Diet: DASH/TLC  Discharge disposition: Home pending final culture results.

## 2023-11-05 NOTE — PROGRESS NOTE ADULT - SUBJECTIVE AND OBJECTIVE BOX
Patient is a 38y old  Male who presents with a chief complaint of Left neck mass (04 Nov 2023 13:36)      BRIEF HOSPITAL COURSE: 38M with newly diagnosed diabetes mellitus presented with a left neck mass for 1 week, one night of fevers resolved, admitted for infectious vs malignancy workup of mass. Likely infectious, pending some labs, TB negative.     INTERVAL HPI/OVERNIGHT EVENTS:  No acute events overnight.    TODAY: Patient has no complaints overnight and is wondering when he is being discharged. We discussed at bedside the workup results and what we are waiting for. We discussed his diagnosis of diabetes and educated him on insulin usage and fingerstick. Wife was at bedside as well. : Demi # [id number]    MEDICATIONS  (STANDING):  ampicillin/sulbactam  IVPB 3 Gram(s) IV Intermittent every 6 hours  ampicillin/sulbactam  IVPB      dextrose 5%. 1000 milliLiter(s) (100 mL/Hr) IV Continuous <Continuous>  dextrose 5%. 1000 milliLiter(s) (50 mL/Hr) IV Continuous <Continuous>  dextrose 50% Injectable 25 Gram(s) IV Push once  dextrose 50% Injectable 25 Gram(s) IV Push once  dextrose 50% Injectable 12.5 Gram(s) IV Push once  glucagon  Injectable 1 milliGRAM(s) IntraMuscular once  insulin glargine Injectable (LANTUS) 20 Unit(s) SubCutaneous at bedtime  insulin lispro (ADMELOG) corrective regimen sliding scale   SubCutaneous at bedtime  insulin lispro (ADMELOG) corrective regimen sliding scale   SubCutaneous three times a day before meals  insulin lispro Injectable (ADMELOG) 4 Unit(s) SubCutaneous three times a day before meals    MEDICATIONS  (PRN):  acetaminophen     Tablet .. 650 milliGRAM(s) Oral every 6 hours PRN Temp greater or equal to 38C (100.4F), Mild Pain (1 - 3)  aluminum hydroxide/magnesium hydroxide/simethicone Suspension 30 milliLiter(s) Oral every 4 hours PRN Dyspepsia  dextrose Oral Gel 15 Gram(s) Oral once PRN Blood Glucose LESS THAN 70 milliGRAM(s)/deciliter  melatonin 3 milliGRAM(s) Oral at bedtime PRN Insomnia  ondansetron Injectable 4 milliGRAM(s) IV Push every 8 hours PRN Nausea and/or Vomiting      Allergies    No Known Allergies    Intolerances        REVIEW OF SYSTEMS:  CONSTITUTIONAL: No fever, weight loss, or fatigue  RESPIRATORY: No cough, wheezing, chills or hemoptysis; No shortness of breath  CARDIOVASCULAR: No chest pain, palpitations, dizziness, or leg swelling  GASTROINTESTINAL: No abdominal or epigastric pain. No nausea, vomiting, or hematemesis; No diarrhea or constipation. No melena or hematochezia.  NEUROLOGICAL: No headaches, memory loss, loss of strength, numbness, or tremors  MUSCULOSKELETAL: No joint pain or swelling; No muscle, back, or extremity pain      Vital Signs Last 24 Hrs  T(C): 37.2 (05 Nov 2023 11:08), Max: 37.2 (05 Nov 2023 11:08)  T(F): 98.9 (05 Nov 2023 11:08), Max: 98.9 (05 Nov 2023 11:08)  HR: 66 (05 Nov 2023 11:08) (54 - 66)  BP: 117/68 (05 Nov 2023 11:08) (102/64 - 133/80)  BP(mean): --  RR: 18 (05 Nov 2023 11:08) (18 - 18)  SpO2: 95% (05 Nov 2023 11:08) (95% - 98%)    Parameters below as of 05 Nov 2023 11:08  Patient On (Oxygen Delivery Method): room air    PHYSICAL EXAM:  GENERAL:  A&Ox3, No acute distress, sitting comfortably in bed  HEAD:  Atraumatic, Normocephalic  EYES: EOMI, PERRLA, conjunctiva and sclera clear  NECK: Supple, No JVD, left mass just submandibular lateral. No erythema or tenderness on palpation, mobile.  NERVOUS SYSTEM:  Alert & Oriented, No gross focal deficits,   CHEST/LUNG: Clear to auscultation bilaterally; No rales, rhonchi, wheezing, or rubs  HEART: Regular rate and rhythm; No murmurs, rubs, or gallops  ABDOMEN: Soft, Nontender, Nondistended; Bowel sounds present  EXTREMITIES:  No clubbing, cyanosis, or edema    LABS:                        13.9   6.18  )-----------( 264      ( 05 Nov 2023 06:13 )             41.0     11-05    139  |  103  |  14.6  ----------------------------<  118<H>  3.8   |  23.0  |  0.56    Ca    8.6      05 Nov 2023 06:13  Mg     2.1     11-04    TPro  6.9  /  Alb  3.6  /  TBili  0.5  /  DBili  x   /  AST  32  /  ALT  42<H>  /  AlkPhos  98  11-05      Urinalysis Basic - ( 05 Nov 2023 06:13 )    Color: x / Appearance: x / SG: x / pH: x  Gluc: 118 mg/dL / Ketone: x  / Bili: x / Urobili: x   Blood: x / Protein: x / Nitrite: x   Leuk Esterase: x / RBC: x / WBC x   Sq Epi: x / Non Sq Epi: x / Bacteria: x      CAPILLARY BLOOD GLUCOSE      POCT Blood Glucose.: 167 mg/dL (05 Nov 2023 13:16)  POCT Blood Glucose.: 92 mg/dL (05 Nov 2023 09:08)  POCT Blood Glucose.: 197 mg/dL (04 Nov 2023 23:16)  POCT Blood Glucose.: 152 mg/dL (04 Nov 2023 18:33)      RADIOLOGY & ADDITIONAL TESTS:  Pending repeat CT non contrast of neck soft tissue.    Imaging Personally Reviewed:  [X] YES  [ ] NO  Consultant(s) Notes Reviewed:  [X] YES  [ ] NO  Care Discussed with Consultants/Other Providers [X] YES  [ ] NO  Plan of Care discussed with House Staff: [X]YES [ ] NO

## 2023-11-05 NOTE — PROGRESS NOTE ADULT - ASSESSMENT
37 y/o male presented to ENT for L neck mass s/p needle aspiration with IR. On exam patient is afebrile and hemodynamically stable with 3.0 x 1.5 cm mass firm and tender, palpable at the intersection between omohyoid and sternocleidomastoid. Denies compressive sxs. Labs showed no leukocytosis yesterday, will follow up on AM labs. Follow up IR cultures.     PLAN  May consider further drainage of abscess  Follow up AM labs  Follow up IR cultures

## 2023-11-06 ENCOUNTER — TRANSCRIPTION ENCOUNTER (OUTPATIENT)
Age: 38
End: 2023-11-06

## 2023-11-06 PROBLEM — Z00.00 ENCOUNTER FOR PREVENTIVE HEALTH EXAMINATION: Status: ACTIVE | Noted: 2023-11-06

## 2023-11-06 LAB
ANION GAP SERPL CALC-SCNC: 13 MMOL/L — SIGNIFICANT CHANGE UP (ref 5–17)
ANION GAP SERPL CALC-SCNC: 13 MMOL/L — SIGNIFICANT CHANGE UP (ref 5–17)
BUN SERPL-MCNC: 13.6 MG/DL — SIGNIFICANT CHANGE UP (ref 8–20)
BUN SERPL-MCNC: 13.6 MG/DL — SIGNIFICANT CHANGE UP (ref 8–20)
CALCIUM SERPL-MCNC: 9 MG/DL — SIGNIFICANT CHANGE UP (ref 8.4–10.5)
CALCIUM SERPL-MCNC: 9 MG/DL — SIGNIFICANT CHANGE UP (ref 8.4–10.5)
CHLORIDE SERPL-SCNC: 102 MMOL/L — SIGNIFICANT CHANGE UP (ref 96–108)
CHLORIDE SERPL-SCNC: 102 MMOL/L — SIGNIFICANT CHANGE UP (ref 96–108)
CO2 SERPL-SCNC: 25 MMOL/L — SIGNIFICANT CHANGE UP (ref 22–29)
CO2 SERPL-SCNC: 25 MMOL/L — SIGNIFICANT CHANGE UP (ref 22–29)
CREAT SERPL-MCNC: 0.6 MG/DL — SIGNIFICANT CHANGE UP (ref 0.5–1.3)
CREAT SERPL-MCNC: 0.6 MG/DL — SIGNIFICANT CHANGE UP (ref 0.5–1.3)
EGFR: 125 ML/MIN/1.73M2 — SIGNIFICANT CHANGE UP
EGFR: 125 ML/MIN/1.73M2 — SIGNIFICANT CHANGE UP
GLUCOSE BLDC GLUCOMTR-MCNC: 104 MG/DL — HIGH (ref 70–99)
GLUCOSE BLDC GLUCOMTR-MCNC: 104 MG/DL — HIGH (ref 70–99)
GLUCOSE BLDC GLUCOMTR-MCNC: 109 MG/DL — HIGH (ref 70–99)
GLUCOSE BLDC GLUCOMTR-MCNC: 109 MG/DL — HIGH (ref 70–99)
GLUCOSE BLDC GLUCOMTR-MCNC: 130 MG/DL — HIGH (ref 70–99)
GLUCOSE BLDC GLUCOMTR-MCNC: 130 MG/DL — HIGH (ref 70–99)
GLUCOSE BLDC GLUCOMTR-MCNC: 187 MG/DL — HIGH (ref 70–99)
GLUCOSE BLDC GLUCOMTR-MCNC: 187 MG/DL — HIGH (ref 70–99)
GLUCOSE BLDC GLUCOMTR-MCNC: 75 MG/DL — SIGNIFICANT CHANGE UP (ref 70–99)
GLUCOSE BLDC GLUCOMTR-MCNC: 75 MG/DL — SIGNIFICANT CHANGE UP (ref 70–99)
GLUCOSE SERPL-MCNC: 84 MG/DL — SIGNIFICANT CHANGE UP (ref 70–99)
GLUCOSE SERPL-MCNC: 84 MG/DL — SIGNIFICANT CHANGE UP (ref 70–99)
HCT VFR BLD CALC: 41.4 % — SIGNIFICANT CHANGE UP (ref 39–50)
HCT VFR BLD CALC: 41.4 % — SIGNIFICANT CHANGE UP (ref 39–50)
HGB BLD-MCNC: 13.8 G/DL — SIGNIFICANT CHANGE UP (ref 13–17)
HGB BLD-MCNC: 13.8 G/DL — SIGNIFICANT CHANGE UP (ref 13–17)
MCHC RBC-ENTMCNC: 30.1 PG — SIGNIFICANT CHANGE UP (ref 27–34)
MCHC RBC-ENTMCNC: 30.1 PG — SIGNIFICANT CHANGE UP (ref 27–34)
MCHC RBC-ENTMCNC: 33.3 GM/DL — SIGNIFICANT CHANGE UP (ref 32–36)
MCHC RBC-ENTMCNC: 33.3 GM/DL — SIGNIFICANT CHANGE UP (ref 32–36)
MCV RBC AUTO: 90.4 FL — SIGNIFICANT CHANGE UP (ref 80–100)
MCV RBC AUTO: 90.4 FL — SIGNIFICANT CHANGE UP (ref 80–100)
PLATELET # BLD AUTO: 287 K/UL — SIGNIFICANT CHANGE UP (ref 150–400)
PLATELET # BLD AUTO: 287 K/UL — SIGNIFICANT CHANGE UP (ref 150–400)
POTASSIUM SERPL-MCNC: 3.9 MMOL/L — SIGNIFICANT CHANGE UP (ref 3.5–5.3)
POTASSIUM SERPL-MCNC: 3.9 MMOL/L — SIGNIFICANT CHANGE UP (ref 3.5–5.3)
POTASSIUM SERPL-SCNC: 3.9 MMOL/L — SIGNIFICANT CHANGE UP (ref 3.5–5.3)
POTASSIUM SERPL-SCNC: 3.9 MMOL/L — SIGNIFICANT CHANGE UP (ref 3.5–5.3)
RBC # BLD: 4.58 M/UL — SIGNIFICANT CHANGE UP (ref 4.2–5.8)
RBC # BLD: 4.58 M/UL — SIGNIFICANT CHANGE UP (ref 4.2–5.8)
RBC # FLD: 12 % — SIGNIFICANT CHANGE UP (ref 10.3–14.5)
RBC # FLD: 12 % — SIGNIFICANT CHANGE UP (ref 10.3–14.5)
SODIUM SERPL-SCNC: 139 MMOL/L — SIGNIFICANT CHANGE UP (ref 135–145)
SODIUM SERPL-SCNC: 139 MMOL/L — SIGNIFICANT CHANGE UP (ref 135–145)
WBC # BLD: 7.37 K/UL — SIGNIFICANT CHANGE UP (ref 3.8–10.5)
WBC # BLD: 7.37 K/UL — SIGNIFICANT CHANGE UP (ref 3.8–10.5)
WBC # FLD AUTO: 7.37 K/UL — SIGNIFICANT CHANGE UP (ref 3.8–10.5)
WBC # FLD AUTO: 7.37 K/UL — SIGNIFICANT CHANGE UP (ref 3.8–10.5)

## 2023-11-06 PROCEDURE — 99232 SBSQ HOSP IP/OBS MODERATE 35: CPT

## 2023-11-06 RX ORDER — ISOPROPYL ALCOHOL, BENZOCAINE .7; .06 ML/ML; ML/ML
0 SWAB TOPICAL
Qty: 100 | Refills: 1
Start: 2023-11-06

## 2023-11-06 RX ORDER — ACETAMINOPHEN 500 MG
2 TABLET ORAL
Qty: 0 | Refills: 0 | DISCHARGE
Start: 2023-11-06

## 2023-11-06 RX ORDER — INSULIN GLARGINE 100 [IU]/ML
16 INJECTION, SOLUTION SUBCUTANEOUS AT BEDTIME
Refills: 0 | Status: DISCONTINUED | OUTPATIENT
Start: 2023-11-06 | End: 2023-11-07

## 2023-11-06 RX ORDER — INSULIN LISPRO 100/ML
4 VIAL (ML) SUBCUTANEOUS
Qty: 100 | Refills: 0
Start: 2023-11-06 | End: 2023-12-05

## 2023-11-06 RX ORDER — INSULIN GLARGINE 100 [IU]/ML
16 INJECTION, SOLUTION SUBCUTANEOUS
Qty: 100 | Refills: 3
Start: 2023-11-06 | End: 2024-03-04

## 2023-11-06 RX ADMIN — Medication 4 UNIT(S): at 12:47

## 2023-11-06 RX ADMIN — AMPICILLIN SODIUM AND SULBACTAM SODIUM 200 GRAM(S): 250; 125 INJECTION, POWDER, FOR SUSPENSION INTRAMUSCULAR; INTRAVENOUS at 05:45

## 2023-11-06 RX ADMIN — AMPICILLIN SODIUM AND SULBACTAM SODIUM 200 GRAM(S): 250; 125 INJECTION, POWDER, FOR SUSPENSION INTRAMUSCULAR; INTRAVENOUS at 17:09

## 2023-11-06 RX ADMIN — INSULIN GLARGINE 16 UNIT(S): 100 INJECTION, SOLUTION SUBCUTANEOUS at 21:40

## 2023-11-06 RX ADMIN — AMPICILLIN SODIUM AND SULBACTAM SODIUM 200 GRAM(S): 250; 125 INJECTION, POWDER, FOR SUSPENSION INTRAMUSCULAR; INTRAVENOUS at 23:33

## 2023-11-06 RX ADMIN — Medication 4 UNIT(S): at 17:09

## 2023-11-06 RX ADMIN — AMPICILLIN SODIUM AND SULBACTAM SODIUM 200 GRAM(S): 250; 125 INJECTION, POWDER, FOR SUSPENSION INTRAMUSCULAR; INTRAVENOUS at 12:46

## 2023-11-06 NOTE — PROGRESS NOTE ADULT - SUBJECTIVE AND OBJECTIVE BOX
SUBJECTIVE:    Chief Complaint: Patient is a 38y old  Male who presents with a chief complaint of Left neck mass (04 Nov 2023 13:36)      INTERVAL HPI/OVERNIGHT EVENTS: Patient seen and examined at bedside at AM. No clinically acute event overnight.  Denies any chest pain, palpitations, SOB, leg edema, N/V/D, or fevers.    MEDICATIONS  (STANDING):  ampicillin/sulbactam  IVPB 3 Gram(s) IV Intermittent every 6 hours  ampicillin/sulbactam  IVPB      dextrose 5%. 1000 milliLiter(s) (100 mL/Hr) IV Continuous <Continuous>  dextrose 5%. 1000 milliLiter(s) (50 mL/Hr) IV Continuous <Continuous>  dextrose 50% Injectable 25 Gram(s) IV Push once  dextrose 50% Injectable 25 Gram(s) IV Push once  dextrose 50% Injectable 12.5 Gram(s) IV Push once  glucagon  Injectable 1 milliGRAM(s) IntraMuscular once  insulin glargine Injectable (LANTUS) 20 Unit(s) SubCutaneous at bedtime  insulin lispro (ADMELOG) corrective regimen sliding scale   SubCutaneous at bedtime  insulin lispro (ADMELOG) corrective regimen sliding scale   SubCutaneous three times a day before meals  insulin lispro Injectable (ADMELOG) 4 Unit(s) SubCutaneous three times a day before meals    MEDICATIONS  (PRN):  acetaminophen     Tablet .. 650 milliGRAM(s) Oral every 6 hours PRN Temp greater or equal to 38C (100.4F), Mild Pain (1 - 3)  aluminum hydroxide/magnesium hydroxide/simethicone Suspension 30 milliLiter(s) Oral every 4 hours PRN Dyspepsia  dextrose Oral Gel 15 Gram(s) Oral once PRN Blood Glucose LESS THAN 70 milliGRAM(s)/deciliter  melatonin 3 milliGRAM(s) Oral at bedtime PRN Insomnia  ondansetron Injectable 4 milliGRAM(s) IV Push every 8 hours PRN Nausea and/or Vomiting      Allergies    No Known Allergies    Intolerances        REVIEW OF SYSTEMS:  All other review of systems negative, except as noted in HPI    OBJECTIVE:    Vital Signs Last 24 Hrs  T(C): 36.5 (06 Nov 2023 04:45), Max: 37.1 (05 Nov 2023 18:28)  T(F): 97.7 (06 Nov 2023 04:45), Max: 98.7 (05 Nov 2023 18:28)  HR: 62 (06 Nov 2023 04:45) (60 - 68)  BP: 108/69 (06 Nov 2023 04:45) (108/69 - 134/78)  BP(mean): --  RR: 18 (06 Nov 2023 04:45) (18 - 18)  SpO2: 98% (06 Nov 2023 04:45) (96% - 98%)    Parameters below as of 06 Nov 2023 04:45  Patient On (Oxygen Delivery Method): room air    PHYSICAL EXAM:  GENERAL: NAD, lying in bed comfortably  HEAD:  Atraumatic, Normocephalic  EYES: EOMI, PERRLA, conjunctiva and sclera clear  ENT: Moist mucous membranes  NECK: Left neck swelling (+), no surrounding erythema or tenderness Supple, No JVD.  CHEST/LUNG: Clear to auscultation bilaterally; No rales, rhonchi, wheezing, or rubs. Unlabored respirations  HEART: Regular rate and rhythm; No murmurs, rubs, or gallops  ABDOMEN: BSx4; Soft, nontender, nondistended  EXTREMITIES:  2+ Peripheral Pulses, brisk capillary refill. No clubbing, cyanosis, or edema  NERVOUS SYSTEM:  A&Ox3, no focal deficits   SKIN: No rashes or lesions    Lab/ Imaging:    LABS:                        13.8   7.37  )-----------( 287      ( 06 Nov 2023 06:26 )             41.4     11-06    139  |  102  |  13.6  ----------------------------<  84  3.9   |  25.0  |  0.60    Ca    9.0      06 Nov 2023 06:26    TPro  6.9  /  Alb  3.6  /  TBili  0.5  /  DBili  x   /  AST  32  /  ALT  42<H>  /  AlkPhos  98  11-05      Urinalysis Basic - ( 06 Nov 2023 06:26 )    Color: x / Appearance: x / SG: x / pH: x  Gluc: 84 mg/dL / Ketone: x  / Bili: x / Urobili: x   Blood: x / Protein: x / Nitrite: x   Leuk Esterase: x / RBC: x / WBC x   Sq Epi: x / Non Sq Epi: x / Bacteria: x      CAPILLARY BLOOD GLUCOSE      POCT Blood Glucose.: 75 mg/dL (06 Nov 2023 08:48)  POCT Blood Glucose.: 105 mg/dL (05 Nov 2023 21:29)  POCT Blood Glucose.: 106 mg/dL (05 Nov 2023 18:17)  POCT Blood Glucose.: 167 mg/dL (05 Nov 2023 13:16)       SUBJECTIVE:    Chief Complaint: Patient is a 38y old  Male who presents with a chief complaint of Left neck mass (04 Nov 2023 13:36)      INTERVAL HPI/OVERNIGHT EVENTS: Patient seen and examined at bedside at AM. No clinically acute event overnight.  Denies any chest pain, palpitations, SOB, leg edema, N/V/D, or fevers.    MEDICATIONS  (STANDING):  ampicillin/sulbactam  IVPB 3 Gram(s) IV Intermittent every 6 hours  ampicillin/sulbactam  IVPB      dextrose 5%. 1000 milliLiter(s) (100 mL/Hr) IV Continuous <Continuous>  dextrose 5%. 1000 milliLiter(s) (50 mL/Hr) IV Continuous <Continuous>  dextrose 50% Injectable 25 Gram(s) IV Push once  dextrose 50% Injectable 25 Gram(s) IV Push once  dextrose 50% Injectable 12.5 Gram(s) IV Push once  glucagon  Injectable 1 milliGRAM(s) IntraMuscular once  insulin glargine Injectable (LANTUS) 20 Unit(s) SubCutaneous at bedtime  insulin lispro (ADMELOG) corrective regimen sliding scale   SubCutaneous at bedtime  insulin lispro (ADMELOG) corrective regimen sliding scale   SubCutaneous three times a day before meals  insulin lispro Injectable (ADMELOG) 4 Unit(s) SubCutaneous three times a day before meals    MEDICATIONS  (PRN):  acetaminophen     Tablet .. 650 milliGRAM(s) Oral every 6 hours PRN Temp greater or equal to 38C (100.4F), Mild Pain (1 - 3)  aluminum hydroxide/magnesium hydroxide/simethicone Suspension 30 milliLiter(s) Oral every 4 hours PRN Dyspepsia  dextrose Oral Gel 15 Gram(s) Oral once PRN Blood Glucose LESS THAN 70 milliGRAM(s)/deciliter  melatonin 3 milliGRAM(s) Oral at bedtime PRN Insomnia  ondansetron Injectable 4 milliGRAM(s) IV Push every 8 hours PRN Nausea and/or Vomiting      Allergies    No Known Allergies    Intolerances        REVIEW OF SYSTEMS:  All other review of systems negative, except as noted in HPI    OBJECTIVE:    Vital Signs Last 24 Hrs  T(C): 36.5 (06 Nov 2023 04:45), Max: 37.1 (05 Nov 2023 18:28)  T(F): 97.7 (06 Nov 2023 04:45), Max: 98.7 (05 Nov 2023 18:28)  HR: 62 (06 Nov 2023 04:45) (60 - 68)  BP: 108/69 (06 Nov 2023 04:45) (108/69 - 134/78)  BP(mean): --  RR: 18 (06 Nov 2023 04:45) (18 - 18)  SpO2: 98% (06 Nov 2023 04:45) (96% - 98%)    Parameters below as of 06 Nov 2023 04:45  Patient On (Oxygen Delivery Method): room air    PHYSICAL EXAM:  GENERAL: NAD, lying in bed comfortably  HEAD:  Atraumatic, Normocephalic  EYES: EOMI, PERRLA, conjunctiva and sclera clear  ENT: Moist mucous membranes  NECK: Left neck swelling (+), no surrounding erythema or tenderness Supple, No JVD.  CHEST/LUNG: Clear to auscultation bilaterally; No rales, rhonchi, wheezing, or rubs. Unlabored respirations  HEART: Regular rate and rhythm; No murmurs, rubs, or gallops  ABDOMEN: BSx4; Soft, nontender, nondistended  EXTREMITIES:  2+ Peripheral Pulses, brisk capillary refill. No clubbing, cyanosis, or edema  NERVOUS SYSTEM:  A&Ox3, no focal deficits   SKIN: No rashes or lesions    Lab/ Imaging:    LABS:                        13.8   7.37  )-----------( 287      ( 06 Nov 2023 06:26 )             41.4     11-06    139  |  102  |  13.6  ----------------------------<  84  3.9   |  25.0  |  0.60    Ca    9.0      06 Nov 2023 06:26    TPro  6.9  /  Alb  3.6  /  TBili  0.5  /  DBili  x   /  AST  32  /  ALT  42<H>  /  AlkPhos  98  11-05      Urinalysis Basic - ( 06 Nov 2023 06:26 )    Color: x / Appearance: x / SG: x / pH: x  Gluc: 84 mg/dL / Ketone: x  / Bili: x / Urobili: x   Blood: x / Protein: x / Nitrite: x   Leuk Esterase: x / RBC: x / WBC x   Sq Epi: x / Non Sq Epi: x / Bacteria: x      CAPILLARY BLOOD GLUCOSE      POCT Blood Glucose.: 75 mg/dL (06 Nov 2023 08:48)  POCT Blood Glucose.: 105 mg/dL (05 Nov 2023 21:29)  POCT Blood Glucose.: 106 mg/dL (05 Nov 2023 18:17)  POCT Blood Glucose.: 167 mg/dL (05 Nov 2023 13:16)    < from: CT Neck Soft Tissue No Cont (11.05.23 @ 12:34) >  IMPRESSION:  Suboptimal assessment without intravenous contrast.    No significant interval change to partially cystic and solid mass within   the left deep neck, IIA node level, with surrounding inflammatory   changes. No new collection or adenopathy.    < end of copied text >

## 2023-11-06 NOTE — PROGRESS NOTE ADULT - ASSESSMENT
38M with no PMHx presented to hospital because of several week history of enlarging neck mass, incidentally found to have diabetes. Endorses recent polydipsia and polyuria.    1. New onset diabetes, uncontrolled - a1c 12%  - Glucose 75 this morning, reduce lantus to 16 units qhs  - Continue admelog 4 units tid and sliding scale coverage  - Will arrange outpatient follow up with our office  - Basal bolus therapy upon discharge, needs insulin/meter teaching    2. Neck Mass  - S/p IR needle aspiration  - On IV abx  - ID and ENT following

## 2023-11-06 NOTE — PROGRESS NOTE ADULT - ASSESSMENT
38 year old male with no prior PMH presents from home with 1 week increasing neck mass. States that approx 1 week ago he started noticing some swelling under his jaw after having localized pain there. Reports that i has grown in size since then which is why he decided on coming to the hospital, In the ER imaging done and ENT consulted for a 5 cm mass.    He denies any recent travel history, [facility], moved to [place] ~17 years ago), no recent sick contacts (including TB), no fevers, chills, night sweats or unintentional weight loss. Does repots a mild decrease in appetite for ~ 6 months. No recent dental work     AS ABOVE WITH NECK MASS NON TOXIC  AFEBRILE   CT SCAN ? INFECTIOUS PROCESS  DENIES ILL HOUSEHOLD CONTACTS NO PETS AT HOME NO RECENT TRAVEL   ASPIRATION DONE FOR CX GM STAIN  FUNGAL AND AFB-so far negative  PUS  NOTED  WHEN ASPIRATION DONE  Ct chest reported clear lungs  quantiferon NEG  HIV (-)  BCX ngtd  AFB BCX ngtd  Tissue fungal cx ngtd  tissue AFB CX ngtd   OVERALL IMPROVED  ON UNASYN   FOR CHANGE TO ORAL AUGMENTIN AND D/C HOME  IN AM  WILL FOLLOW UP AS NEEDED   SPOKE TO HOUSESTAFF

## 2023-11-06 NOTE — DISCHARGE NOTE PROVIDER - NSDCMRMEDTOKEN_GEN_ALL_CORE_FT
acetaminophen 325 mg oral tablet: 2 tab(s) orally every 6 hours As needed Temp greater or equal to 38C (100.4F), Mild Pain (1 - 3)  Admelog SoloStar 100 units/mL injectable solution: 4 unit(s) subcutaneous 3 times a day (before meals)  alcohol swabs: Apply topically to affected area 4 times a day  amoxicillin-clavulanate 875 mg-125 mg oral tablet: 875 milligram(s) orally 2 times a day  glucometer (per patient&#x27;s insurance): Test blood sugars four times a day. Dispense #1 glucometer.  Glucose test strips: Check Blood glucose 4 times a day  Lancets: Glucose checks 4 times a day  Jorge A Stout Pen 100 units/mL subcutaneous solution: 16 unit(s) subcutaneous once a day (at bedtime)  pen needles: Glucose checks 4 times a day

## 2023-11-06 NOTE — PROGRESS NOTE ADULT - PROBLEM SELECTOR PLAN 1
Patient afebrile, nontoxic, WBC wnl  IR aspirate culture from 11/3 without growth, no acid fast bacilli seen   Fungal culture pending   Cytopathology pending     Plan of care discussed with Dr. Jalloh Patient afebrile, nontoxic, WBC wnl  On Unasyn, ID recs appreciated  IR aspirate culture from 11/3 without growth, no acid fast bacilli seen   Fungal culture pending   Cytopathology pending   Patient to be discharged home tomorrow on PO abx per primary team    Plan of care discussed with Dr. Jalloh

## 2023-11-06 NOTE — DISCHARGE NOTE PROVIDER - ATTENDING DISCHARGE PHYSICAL EXAMINATION:
LOS: 4d    VITALS:   T(C): 37.1 (11-06-23 @ 12:21), Max: 37.1 (11-05-23 @ 18:28)  HR: 63 (11-06-23 @ 12:21) (60 - 68)  BP: 118/64 (11-06-23 @ 12:21) (108/69 - 134/78)  RR: 18 (11-06-23 @ 12:21) (18 - 18)  SpO2: 98% (11-06-23 @ 12:21) (96% - 98%)    GENERAL: NAD, lying in bed comfortably  HEAD:  Atraumatic, Normocephalic  EYES: EOMI, PERRLA, conjunctiva and sclera clear  ENT: Moist mucous membranes  NECK: Left neck swelling (+), no surrounding erythema or tenderness Supple, No JVD.  CHEST/LUNG: Clear to auscultation bilaterally; No rales, rhonchi, wheezing, or rubs. Unlabored respirations  HEART: Regular rate and rhythm; No murmurs, rubs, or gallops  ABDOMEN: BSx4; Soft, nontender, nondistended  EXTREMITIES:  2+ Peripheral Pulses, brisk capillary refill. No clubbing, cyanosis, or edema  NERVOUS SYSTEM:  A&Ox3, no focal deficits   SKIN: No rashes or lesions

## 2023-11-06 NOTE — ADVANCED PRACTICE NURSE CONSULT - ASSESSMENT
return to see pt in am, pt is a+ox3 co 0 pain. he states he is injecting insulin and feels comfortible w it. he was invited to the [employer] [place] [employer] on januarey 23 2024 . yearly calendar provided. pt  return to see pt in am, pt is a+ox3 co 0 pain. he states he is injecting insulin and feels comfortible w it. he was invited to the [place] [employer] on januarey 23 2024 . yearly calendar provided.

## 2023-11-06 NOTE — PROGRESS NOTE ADULT - SUBJECTIVE AND OBJECTIVE BOX
INFECTIOUS DISEASES AND INTERNAL MEDICINE at [place]  =======================================================  Zelalem Almodovar MD  Diplomates American Board of Internal Medicine and Infectious Diseases  Telephone [phone]  Fax            [phone]  =======================================================    JOELLE ARGUETA [id number]    Follow up: neck abscess    Allergies:  No Known Allergies      Medications:  acetaminophen     Tablet .. 650 milliGRAM(s) Oral every 6 hours PRN  aluminum hydroxide/magnesium hydroxide/simethicone Suspension 30 milliLiter(s) Oral every 4 hours PRN  ampicillin/sulbactam  IVPB 3 Gram(s) IV Intermittent every 6 hours  ampicillin/sulbactam  IVPB      dextrose 5%. 1000 milliLiter(s) IV Continuous <Continuous>  dextrose 5%. 1000 milliLiter(s) IV Continuous <Continuous>  dextrose 50% Injectable 25 Gram(s) IV Push once  dextrose 50% Injectable 25 Gram(s) IV Push once  dextrose 50% Injectable 12.5 Gram(s) IV Push once  dextrose Oral Gel 15 Gram(s) Oral once PRN  glucagon  Injectable 1 milliGRAM(s) IntraMuscular once  insulin glargine Injectable (LANTUS) 16 Unit(s) SubCutaneous at bedtime  insulin lispro (ADMELOG) corrective regimen sliding scale   SubCutaneous at bedtime  insulin lispro (ADMELOG) corrective regimen sliding scale   SubCutaneous three times a day before meals  insulin lispro Injectable (ADMELOG) 4 Unit(s) SubCutaneous three times a day before meals  melatonin 3 milliGRAM(s) Oral at bedtime PRN  ondansetron Injectable 4 milliGRAM(s) IV Push every 8 hours PRN    SOCIAL       FAMILY   FAMILY HISTORY:    REVIEW OF SYSTEMS:  CONSTITUTIONAL:  No Fever or chills  HEENT:   No diplopia or blurred vision.  No earache, sore throat or runny nose.  CARDIOVASCULAR:  No pressure, squeezing, strangling, tightness, heaviness or aching about the chest, neck, axilla or epigastrium.  RESPIRATORY:  No cough, shortness of breath, PND or orthopnea.  GASTROINTESTINAL:  No nausea, vomiting or diarrhea.  GENITOURINARY:  No dysuria, frequency or urgency. No Blood in urine  MUSCULOSKELETAL:   moves all joints  SKIN:  No change in skin, hair or nails.  NEUROLOGIC:  No paresthesias, fasciculations, seizures or weakness.  PSYCHIATRIC:  No disorder of thought or mood.  ENDOCRINE:  No heat or cold intolerance, polyuria or polydipsia.  HEMATOLOGICAL:  No easy bruising or bleeding.            Physical Exam:  ICU Vital Signs Last 24 Hrs  T(C): 37.1 (06 Nov 2023 12:21), Max: 37.1 (05 Nov 2023 18:28)  T(F): 98.7 (06 Nov 2023 12:21), Max: 98.7 (05 Nov 2023 18:28)  HR: 63 (06 Nov 2023 12:21) (60 - 68)  BP: 118/64 (06 Nov 2023 12:21) (108/69 - 134/78)  BP(mean): --  ABP: --  ABP(mean): --  RR: 18 (06 Nov 2023 12:21) (18 - 18)  SpO2: 98% (06 Nov 2023 12:21) (96% - 98%)    O2 Parameters below as of 06 Nov 2023 12:21  Patient On (Oxygen Delivery Method): room air          GEN: NAD,   HEENT: normocephalic and atraumatic. EOMI. SASHA.    NECK: Supple. No carotid bruits.  No lymphadenopathy or thyromegaly. decreased left neck mass   LUNGS: Clear to auscultation.  HEART: Regular rate and rhythm without murmur.  ABDOMEN: Soft, nontender, and nondistended.  Positive bowel sounds.    : No CVA tenderness  EXTREMITIES: Without any cyanosis, clubbing, rash, lesions or edema.  MSK: no joint swelling  NEUROLOGIC: Cranial nerves II through XII are grossly intact.  PSYCHIATRIC: Appropriate affect .  SKIN: No ulceration or induration present.        Labs:  Vitals:  ============  T(F): 98.7 (06 Nov 2023 12:21), Max: 98.7 (05 Nov 2023 18:28)  HR: 63 (06 Nov 2023 12:21)  BP: 118/64 (06 Nov 2023 12:21)  RR: 18 (06 Nov 2023 12:21)  SpO2: 98% (06 Nov 2023 12:21) (96% - 98%)  temp max in last 48H T(F): , Max: 98.9 (11-05-23 @ 11:08)    =======================================================  Current Antibiotics:  ampicillin/sulbactam  IVPB 3 Gram(s) IV Intermittent every 6 hours  ampicillin/sulbactam  IVPB        Other medications:  dextrose 5%. 1000 milliLiter(s) IV Continuous <Continuous>  dextrose 5%. 1000 milliLiter(s) IV Continuous <Continuous>  dextrose 50% Injectable 25 Gram(s) IV Push once  dextrose 50% Injectable 25 Gram(s) IV Push once  dextrose 50% Injectable 12.5 Gram(s) IV Push once  glucagon  Injectable 1 milliGRAM(s) IntraMuscular once  insulin glargine Injectable (LANTUS) 16 Unit(s) SubCutaneous at bedtime  insulin lispro (ADMELOG) corrective regimen sliding scale   SubCutaneous at bedtime  insulin lispro (ADMELOG) corrective regimen sliding scale   SubCutaneous three times a day before meals  insulin lispro Injectable (ADMELOG) 4 Unit(s) SubCutaneous three times a day before meals      =======================================================  Labs:                        13.8   7.37  )-----------( 287      ( 06 Nov 2023 06:26 )             41.4     11-06    139  |  102  |  13.6  ----------------------------<  84  3.9   |  25.0  |  0.60    Ca    9.0      06 Nov 2023 06:26    TPro  6.9  /  Alb  3.6  /  TBili  0.5  /  DBili  x   /  AST  32  /  ALT  42<H>  /  AlkPhos  98  11-05      Culture - Tissue with Gram Stain (collected 11-03-23 @ 09:00)  Source: .Tissue left neck  Gram Stain (11-03-23 @ 14:55):    Numerous polymorphonuclear leukocytes seen per low power field    No organisms seen per oil power field    Culture - Fungal, Tissue (collected 11-03-23 @ 09:00)  Source: .Tissue Other, left neck mass    Culture - Acid Fast - Tissue w/Smear (collected 11-03-23 @ 09:00)  Source: .Tissue Other, left neck mass    Culture - Acid Fast - Blood (collected 11-02-23 @ 15:44)  Source: .Blood Blood    Culture - Blood (collected 11-02-23 @ 10:37)  Source: .Blood Blood-Peripheral    Culture - Blood (collected 11-02-23 @ 10:35)  Source: .Blood Blood-Peripheral      Creatinine: 0.60 mg/dL (11-06-23 @ 06:26)  Creatinine: 0.56 mg/dL (11-05-23 @ 06:13)  Creatinine: 0.54 mg/dL (11-04-23 @ 05:50)  Creatinine: 0.57 mg/dL (11-03-23 @ 02:49)  Creatinine: 0.61 mg/dL (11-01-23 @ 20:00)          C-Reactive Protein, Serum: 6 mg/L (11-03-23 @ 02:49)  C-Reactive Protein, Serum: 8 mg/L (11-02-23 @ 13:15)    WBC Count: 7.37 K/uL (11-06-23 @ 06:26)  WBC Count: 6.18 K/uL (11-05-23 @ 06:13)  WBC Count: 7.62 K/uL (11-04-23 @ 05:50)  WBC Count: 9.51 K/uL (11-03-23 @ 02:49)  WBC Count: 6.84 K/uL (11-01-23 @ 20:00)        Alkaline Phosphatase: 98 U/L (11-05-23 @ 06:13)  Alkaline Phosphatase: 109 U/L (11-04-23 @ 05:50)  Alanine Aminotransferase (ALT/SGPT): 42 U/L (11-05-23 @ 06:13)  Alanine Aminotransferase (ALT/SGPT): 40 U/L (11-04-23 @ 05:50)  Aspartate Aminotransferase (AST/SGOT): 32 U/L (11-05-23 @ 06:13)  Aspartate Aminotransferase (AST/SGOT): 28 U/L (11-04-23 @ 05:50)  Bilirubin Total: 0.5 mg/dL (11-05-23 @ 06:13)  Bilirubin Total: 0.5 mg/dL (11-04-23 @ 05:50)

## 2023-11-06 NOTE — PROGRESS NOTE ADULT - SUBJECTIVE AND OBJECTIVE BOX
ENT ISSUE/POD: Neck abscess    Subjective:   :     Patient was seen and examined at bedside, patient saying " ".     PAST MEDICAL & SURGICAL HISTORY:    Allergies  No Known Allergies        MEDICATIONS  (STANDING):  ampicillin/sulbactam  IVPB 3 Gram(s) IV Intermittent every 6 hours  ampicillin/sulbactam  IVPB      glucagon  Injectable 1 milliGRAM(s) IntraMuscular once  insulin glargine Injectable (LANTUS) 20 Unit(s) SubCutaneous at bedtime  insulin lispro (ADMELOG) corrective regimen sliding scale   SubCutaneous at bedtime  insulin lispro (ADMELOG) corrective regimen sliding scale   SubCutaneous three times a day before meals  insulin lispro Injectable (ADMELOG) 4 Unit(s) SubCutaneous three times a day before meals    MEDICATIONS  (PRN):  acetaminophen     Tablet .. 650 milliGRAM(s) Oral every 6 hours PRN Temp greater or equal to 38C (100.4F), Mild Pain (1 - 3)  aluminum hydroxide/magnesium hydroxide/simethicone Suspension 30 milliLiter(s) Oral every 4 hours PRN Dyspepsia  dextrose Oral Gel 15 Gram(s) Oral once PRN Blood Glucose LESS THAN 70 milliGRAM(s)/deciliter  melatonin 3 milliGRAM(s) Oral at bedtime PRN Insomnia  ondansetron Injectable 4 milliGRAM(s) IV Push every 8 hours PRN Nausea and/or Vomiting      Social History: see consult note    Family history: see consult note    ROS:   ENT: all negative except as noted in HPI   Pulm: denies SOB, cough, hemoptysis  Neuro: denies numbness/tingling, loss of sensation  Endo: denies heat/cold intolerance, excessive sweating      Vital Signs Last 24 Hrs  T(C): 36.5 (06 Nov 2023 04:45), Max: 37.2 (05 Nov 2023 11:08)  T(F): 97.7 (06 Nov 2023 04:45), Max: 98.9 (05 Nov 2023 11:08)  HR: 62 (06 Nov 2023 04:45) (60 - 68)  BP: 108/69 (06 Nov 2023 04:45) (108/69 - 134/78)  RR: 18 (06 Nov 2023 04:45) (18 - 18)  SpO2: 98% (06 Nov 2023 04:45) (95% - 98%)    Parameters below as of 06 Nov 2023 04:45  Patient On (Oxygen Delivery Method): room air                              13.8   7.37  )-----------( 287      ( 06 Nov 2023 06:26 )             41.4    11-06    139  |  102  |  13.6  ----------------------------<  84  3.9   |  25.0  |  0.60    Ca    9.0      06 Nov 2023 06:26    TPro  6.9  /  Alb  3.6  /  TBili  0.5  /  DBili  x   /  AST  32  /  ALT  42<H>  /  AlkPhos  98  11-05       PHYSICAL EXAM:  Gen: NAD  Skin: No rashes, bruises, or lesions  Head: Normocephalic, Atraumatic  Face: no edema, erythema, or fluctuance. Parotid glands soft without mass  Eyes: no scleral injection  Ears: external ears without deformity, drainage, discoloration   Nose: Nares bilaterally patent, no discharge  Mouth: No Stridor / Drooling / Trismus.  Mucosa moist, tongue/uvula midline, oropharynx clear  Neck: Flat, supple, no lymphadenopathy, trachea midline, no masses  Lymphatic: No lymphadenopathy  Resp: breathing easily, no stridor  Neuro: facial nerve intact, no facial droop           : [employer] [id number]    ENT ISSUE/POD: Neck abscess    Subjective:   Patient was seen and examined at bedside, patient saying "The mass on my neck is much smaller." Reports overall improvement in symptoms, denies fever, chills, dysphagia, odynophagia, trismus.       PAST MEDICAL & SURGICAL HISTORY:  n/a      Allergies  No Known Allergies        MEDICATIONS  (STANDING):  ampicillin/sulbactam  IVPB 3 Gram(s) IV Intermittent every 6 hours  ampicillin/sulbactam  IVPB      glucagon  Injectable 1 milliGRAM(s) IntraMuscular once  insulin glargine Injectable (LANTUS) 20 Unit(s) SubCutaneous at bedtime  insulin lispro (ADMELOG) corrective regimen sliding scale   SubCutaneous at bedtime  insulin lispro (ADMELOG) corrective regimen sliding scale   SubCutaneous three times a day before meals  insulin lispro Injectable (ADMELOG) 4 Unit(s) SubCutaneous three times a day before meals    MEDICATIONS  (PRN):  acetaminophen     Tablet .. 650 milliGRAM(s) Oral every 6 hours PRN Temp greater or equal to 38C (100.4F), Mild Pain (1 - 3)  aluminum hydroxide/magnesium hydroxide/simethicone Suspension 30 milliLiter(s) Oral every 4 hours PRN Dyspepsia  dextrose Oral Gel 15 Gram(s) Oral once PRN Blood Glucose LESS THAN 70 milliGRAM(s)/deciliter  melatonin 3 milliGRAM(s) Oral at bedtime PRN Insomnia  ondansetron Injectable 4 milliGRAM(s) IV Push every 8 hours PRN Nausea and/or Vomiting      Social History: see consult note    Family history: see consult note    ROS:   ENT: all negative except as noted in HPI   Pulm: denies SOB, cough, hemoptysis  Neuro: denies numbness/tingling, loss of sensation  Endo: denies heat/cold intolerance, excessive sweating      Vital Signs Last 24 Hrs  T(C): 36.5 (06 Nov 2023 04:45), Max: 37.2 (05 Nov 2023 11:08)  T(F): 97.7 (06 Nov 2023 04:45), Max: 98.9 (05 Nov 2023 11:08)  HR: 62 (06 Nov 2023 04:45) (60 - 68)  BP: 108/69 (06 Nov 2023 04:45) (108/69 - 134/78)  RR: 18 (06 Nov 2023 04:45) (18 - 18)  SpO2: 98% (06 Nov 2023 04:45) (95% - 98%)    Parameters below as of 06 Nov 2023 04:45  Patient On (Oxygen Delivery Method): room air                              13.8   7.37  )-----------( 287      ( 06 Nov 2023 06:26 )             41.4    11-06    139  |  102  |  13.6  ----------------------------<  84  3.9   |  25.0  |  0.60    Ca    9.0      06 Nov 2023 06:26    TPro  6.9  /  Alb  3.6  /  TBili  0.5  /  DBili  x   /  AST  32  /  ALT  42<H>  /  AlkPhos  98  11-05       PHYSICAL EXAM:  Gen: NAD  Skin: No rashes, bruises, or lesions  Head: Normocephalic, Atraumatic  Face: no edema, erythema, or fluctuance. Parotid glands soft without mass  Eyes: no scleral injection  Ears: external ears without deformity, drainage, discoloration   Nose: Nares bilaterally patent, no discharge  Mouth: No Stridor / Drooling / Trismus.  Mucosa moist, tongue/uvula midline, oropharynx clear. +2 palatine tonsils. No lingual tonsil induration on palpation.   Neck: 2cm left level III neck induration, no fluctuance or erythema  Lymphatic: No lymphadenopathy  Resp: breathing easily, no stridor  Neuro: facial nerve intact, no facial droop

## 2023-11-06 NOTE — DISCHARGE NOTE PROVIDER - HOSPITAL COURSE
38-year-old male with no previous medical history presented to the emergency room with a one-week history of an enlarging left-sided neck mass. A CT scan of the neck revealed a 5.0 cm cystic-solid mass in the deep left neck accompanied by regional inflammation. Empiric treatment with IV Unasyn was initiated, and an ENT was consulted. The patient underwent an image-guided biopsy by interventional radiology, yielding purulent fluid that was sent for culture; subsequent blood cultures were negative twice. Concurrently, the patient was found to be hyperglycemic, with a HbA1c level of 12, prompting consultations with endocrinology and a diabetes educator. Management included starting Lantus and pre-meal Admelog. A CT scan of the chest and a negative QuantiFERON test ruled out tuberculosis. Repeat CT imaging showed no internal change to the left neck mass or new collections or adenopathy. He was treated with 5 days of IV Antibiotics (Unasyn) and is being transitioned to PO Augmentin for a total of 10 days upon discharge.    Patient needs to follow-up with Out-patient ENT and Primary Care Physician. 38-year-old male with no previous medical history presented to the emergency room with a one-week history of an enlarging left-sided neck mass. A CT scan of the neck revealed a 5.0 cm cystic-solid mass in the deep left neck accompanied by regional inflammation. Empiric treatment with IV Unasyn was initiated, and an ENT was consulted. The patient underwent an image-guided biopsy by interventional radiology, yielding purulent fluid that was sent for culture; subsequent blood cultures were negative twice. Concurrently, the patient was found to be hyperglycemic, with a HbA1c level of 12, prompting consultations with endocrinology and a diabetes educator. Management included starting Lantus and pre-meal Admelog. A CT scan of the chest and a negative QuantiFERON test ruled out tuberculosis. Repeat CT imaging showed no internal change to the left neck mass or new collections or adenopathy. He was treated with 5 days of IV Antibiotics (Unasyn) and is being transitioned to PO Augmentin for a total of 14 days upon discharge.    Patient needs to follow-up with Out-patient ENT and Primary Care Physician.

## 2023-11-06 NOTE — ADVANCED PRACTICE NURSE CONSULT - RECOMMEDATIONS
continue diabetes self management education  pt to inject all insulin doses while in the hospital using prefilled insulin syringe and rn supervision  insulin pen teaching including pen setting testing dosing and injection  glucose monitoring teaching including fs
continue diabetes self management education  pt to inject all insulin doses while in the hospital using pre filled insulin syringe and rn supervision  insulin pen teaching including pen setting testing dosing and injection  glucose meter teaching including hand hygiene and fs  pls consider dc pt on basal bolus insulin   cc- pls set home care pt is new to insulin

## 2023-11-06 NOTE — PROGRESS NOTE ADULT - SUBJECTIVE AND OBJECTIVE BOX
INTERVAL EVENTS:  Follow up diabetes management    ROS: Denies chest pain, sob, abd pain.     MEDICATIONS  (STANDING):  ampicillin/sulbactam  IVPB 3 Gram(s) IV Intermittent every 6 hours  ampicillin/sulbactam  IVPB      dextrose 5%. 1000 milliLiter(s) (100 mL/Hr) IV Continuous <Continuous>  dextrose 5%. 1000 milliLiter(s) (50 mL/Hr) IV Continuous <Continuous>  dextrose 50% Injectable 25 Gram(s) IV Push once  dextrose 50% Injectable 25 Gram(s) IV Push once  dextrose 50% Injectable 12.5 Gram(s) IV Push once  glucagon  Injectable 1 milliGRAM(s) IntraMuscular once  insulin glargine Injectable (LANTUS) 16 Unit(s) SubCutaneous at bedtime  insulin lispro (ADMELOG) corrective regimen sliding scale   SubCutaneous at bedtime  insulin lispro (ADMELOG) corrective regimen sliding scale   SubCutaneous three times a day before meals  insulin lispro Injectable (ADMELOG) 4 Unit(s) SubCutaneous three times a day before meals    MEDICATIONS  (PRN):  acetaminophen     Tablet .. 650 milliGRAM(s) Oral every 6 hours PRN Temp greater or equal to 38C (100.4F), Mild Pain (1 - 3)  aluminum hydroxide/magnesium hydroxide/simethicone Suspension 30 milliLiter(s) Oral every 4 hours PRN Dyspepsia  dextrose Oral Gel 15 Gram(s) Oral once PRN Blood Glucose LESS THAN 70 milliGRAM(s)/deciliter  melatonin 3 milliGRAM(s) Oral at bedtime PRN Insomnia  ondansetron Injectable 4 milliGRAM(s) IV Push every 8 hours PRN Nausea and/or Vomiting    Allergies  No Known Allergies    Vital Signs Last 24 Hrs  T(C): 37.1 (06 Nov 2023 12:21), Max: 37.1 (05 Nov 2023 18:28)  T(F): 98.7 (06 Nov 2023 12:21), Max: 98.7 (05 Nov 2023 18:28)  HR: 63 (06 Nov 2023 12:21) (60 - 68)  BP: 118/64 (06 Nov 2023 12:21) (108/69 - 134/78)  BP(mean): --  RR: 18 (06 Nov 2023 12:21) (18 - 18)  SpO2: 98% (06 Nov 2023 12:21) (96% - 98%)    Parameters below as of 06 Nov 2023 12:21  Patient On (Oxygen Delivery Method): room air    PHYSICAL EXAM:  General: No apparent distress  Neck: + neck swelling, left  Respiratory: Lungs clear bilaterally, normal rate, effort  Cardiac: +S1, S2, no m/r/g  GI: +BS, soft, non tender, non distended  Extremities: No peripheral edema, no pedal lesions  Neuro: A+O X3, no tremor    LABS:                        13.8   7.37  )-----------( 287      ( 06 Nov 2023 06:26 )             41.4     11-06    139  |  102  |  13.6  ----------------------------<  84  3.9   |  25.0  |  0.60    Ca    9.0      06 Nov 2023 06:26    TPro  6.9  /  Alb  3.6  /  TBili  0.5  /  DBili  x   /  AST  32  /  ALT  42<H>  /  AlkPhos  98  11-05    Urinalysis Basic - ( 06 Nov 2023 06:26 )    Color: x / Appearance: x / SG: x / pH: x  Gluc: 84 mg/dL / Ketone: x  / Bili: x / Urobili: x   Blood: x / Protein: x / Nitrite: x   Leuk Esterase: x / RBC: x / WBC x   Sq Epi: x / Non Sq Epi: x / Bacteria: x      POCT Blood Glucose.: 130 mg/dL (11-06-23 @ 12:45)  POCT Blood Glucose.: 75 mg/dL (11-06-23 @ 08:48)  POCT Blood Glucose.: 105 mg/dL (11-05-23 @ 21:29)  POCT Blood Glucose.: 106 mg/dL (11-05-23 @ 18:17)  POCT Blood Glucose.: 167 mg/dL (11-05-23 @ 13:16)         INTERVAL EVENTS:  Follow up diabetes management  mild fasting hypoglycemia    ROS: Denies chest pain, sob, abd pain.     MEDICATIONS  (STANDING):  ampicillin/sulbactam  IVPB 3 Gram(s) IV Intermittent every 6 hours  ampicillin/sulbactam  IVPB      dextrose 5%. 1000 milliLiter(s) (100 mL/Hr) IV Continuous <Continuous>  dextrose 5%. 1000 milliLiter(s) (50 mL/Hr) IV Continuous <Continuous>  dextrose 50% Injectable 25 Gram(s) IV Push once  dextrose 50% Injectable 25 Gram(s) IV Push once  dextrose 50% Injectable 12.5 Gram(s) IV Push once  glucagon  Injectable 1 milliGRAM(s) IntraMuscular once  insulin glargine Injectable (LANTUS) 16 Unit(s) SubCutaneous at bedtime  insulin lispro (ADMELOG) corrective regimen sliding scale   SubCutaneous at bedtime  insulin lispro (ADMELOG) corrective regimen sliding scale   SubCutaneous three times a day before meals  insulin lispro Injectable (ADMELOG) 4 Unit(s) SubCutaneous three times a day before meals    MEDICATIONS  (PRN):  acetaminophen     Tablet .. 650 milliGRAM(s) Oral every 6 hours PRN Temp greater or equal to 38C (100.4F), Mild Pain (1 - 3)  aluminum hydroxide/magnesium hydroxide/simethicone Suspension 30 milliLiter(s) Oral every 4 hours PRN Dyspepsia  dextrose Oral Gel 15 Gram(s) Oral once PRN Blood Glucose LESS THAN 70 milliGRAM(s)/deciliter  melatonin 3 milliGRAM(s) Oral at bedtime PRN Insomnia  ondansetron Injectable 4 milliGRAM(s) IV Push every 8 hours PRN Nausea and/or Vomiting    Allergies  No Known Allergies    Vital Signs Last 24 Hrs  T(C): 37.1 (06 Nov 2023 12:21), Max: 37.1 (05 Nov 2023 18:28)  T(F): 98.7 (06 Nov 2023 12:21), Max: 98.7 (05 Nov 2023 18:28)  HR: 63 (06 Nov 2023 12:21) (60 - 68)  BP: 118/64 (06 Nov 2023 12:21) (108/69 - 134/78)  BP(mean): --  RR: 18 (06 Nov 2023 12:21) (18 - 18)  SpO2: 98% (06 Nov 2023 12:21) (96% - 98%)    Parameters below as of 06 Nov 2023 12:21  Patient On (Oxygen Delivery Method): room air    PHYSICAL EXAM:  General: No apparent distress  Neck: + neck swelling, left  Respiratory: Lungs clear bilaterally, normal rate, effort  Cardiac: +S1, S2, no m/r/g  GI: +BS, soft, non tender, non distended  Extremities: No peripheral edema, no pedal lesions  Neuro: A+O X3, no tremor    LABS:                        13.8   7.37  )-----------( 287      ( 06 Nov 2023 06:26 )             41.4     11-06    139  |  102  |  13.6  ----------------------------<  84  3.9   |  25.0  |  0.60    Ca    9.0      06 Nov 2023 06:26    TPro  6.9  /  Alb  3.6  /  TBili  0.5  /  DBili  x   /  AST  32  /  ALT  42<H>  /  AlkPhos  98  11-05    Urinalysis Basic - ( 06 Nov 2023 06:26 )    Color: x / Appearance: x / SG: x / pH: x  Gluc: 84 mg/dL / Ketone: x  / Bili: x / Urobili: x   Blood: x / Protein: x / Nitrite: x   Leuk Esterase: x / RBC: x / WBC x   Sq Epi: x / Non Sq Epi: x / Bacteria: x      POCT Blood Glucose.: 130 mg/dL (11-06-23 @ 12:45)  POCT Blood Glucose.: 75 mg/dL (11-06-23 @ 08:48)  POCT Blood Glucose.: 105 mg/dL (11-05-23 @ 21:29)  POCT Blood Glucose.: 106 mg/dL (11-05-23 @ 18:17)  POCT Blood Glucose.: 167 mg/dL (11-05-23 @ 13:16)

## 2023-11-06 NOTE — DISCHARGE NOTE PROVIDER - NSDCFUSCHEDAPPT_GEN_ALL_CORE_FT
Rosalba Senior  [facility]  ENDOCRIN [id number]  Scheduled Appointment: 11/27/2023     Maxi Jalloh  [facility]  OTOLARYNG [address]  Scheduled Appointment: 11/15/2023    Rosalba Senior  [facility]  ENDOCRIN [id number]  Scheduled Appointment: 11/27/2023

## 2023-11-06 NOTE — ADVANCED PRACTICE NURSE CONSULT - REASON FOR CONSULT
diabetes education pt is new to diabetes dx and insulin
diabetes education pt is new to diabetes dx and insulin

## 2023-11-06 NOTE — PROGRESS NOTE ADULT - ASSESSMENT
38 year old male with no known past medical history who presented to the ER with complaints of increasing left sided neck mass x 1 week. In the ER, CT of the neck showed a  5.0 cm cystic-solid mass identified in the deep left neck with regional inflammatory changes. Started on Empiric IV Unasyn. ENT was consulted for evaluation. Status post IR guided biopsy with purulent fluid sent for culture. Blood cultures negative x 2. Incidentally noted to be hyperglycemic with a Hbaic of 12. Seen by Endocrine and diabetes educator. Started on Lantus and ADMELOG pre-meal. CT chest negative for TB; QuantiFeron negative- TB ruled out.  pending culture results    Assessment/Plan:    1. Left neck mass suspected abscess  - Purulent fluid sent for culture by IR; cultures thus far negative  - Blood cultures negative x 2  - Cytopath and flow cytometry sent  - ENT and ID following  -  IV Unasyn day 4  - Low suspicion for TB- CT chest negative; QuantiFeron negative, precautions to be discharged  - Pending repeat CT neck to assess for possible additional drainage; if improved and no additional intervention, possible d/c home on antibiotics    2. New diagnosis of Diabetes mellitus   - Hbaic of 12  - Lantus 16 units QHS  - Ademlog 4 units TID with meals  - BSL improved  - Continue diabetes education and insulin administration   - spoke with patient at bedside the need for diabetes follow up after discharge, continuation with insulin until switched to PO medications outpatient, and fingerstick education    VTE_ SCDS  Diet: DASH/TLC  Discharge disposition: Home pending final culture results.    38 year old male with no known past medical history who presented to the ER with complaints of increasing left sided neck mass x 1 week. In the ER, CT of the neck showed a  5.0 cm cystic-solid mass identified in the deep left neck with regional inflammatory changes. Started on Empiric IV Unasyn. ENT was consulted for evaluation. Status post IR guided biopsy with purulent fluid sent for culture. Blood cultures negative x 2. Incidentally noted to be hyperglycemic with a Hbaic of 12. Seen by Endocrine and diabetes educator. Started on Lantus and ADMELOG pre-meal. CT chest negative for TB; QuantiFeron negative- TB ruled out.  pending culture results    Assessment/Plan:    1. Left neck mass suspected abscess  - Purulent fluid sent for culture by IR; cultures thus far negative. Pending fungal culture.  - Blood cultures negative x 2  - Cytopath and flow cytometry sent, reports pending   - ENT and ID following, recs appreciated  -  IV Unasyn day 5, will transition to Augmentin on discharge  - Low suspicion for TB- CT chest negative; QuantiFeron negative, precautions to be discharged  - Rpt CT neck (11/05)- No internal change to left neck mass. No new collection/adenopathy.  - IR reconsulted to review images, no appreciable fluid pockets noted for drainage. Recommended Out-patient follow-up     2. New diagnosis of Diabetes mellitus   - Hbaic of 12  - Lantus 16 units QHS  - Ademlog 4 units TID with meals  - BSL improved  - Continue diabetes education and insulin administration   - spoke with patient at bedside the need for diabetes follow up after discharge, continuation with insulin until switched to PO medications outpatient, and fingerstick education    VTE- SCDS  Diet: DASH/TLC  Discharge disposition: Home tomorrow after Diabetic education. Pending final culture results.

## 2023-11-06 NOTE — PROGRESS NOTE ADULT - ASSESSMENT
38M with PNHx uncontrolled diabetes presented to [facility] 11/1/23 for a left neck mass. CT neck 11/1 with cystic-solid mass measuring 3.0 x 3.5 x 5.0 cm extending inferiorly from the jugulodigastric station along the course of the sternocleidomastoid muscle, with surrounding inflammatory changes involving the muscle belly, platysma, and submandibular soft tissues." Patient underwent IR drainage of the lesion on 11/3/23 with aspiration of purulent drainage.

## 2023-11-07 ENCOUNTER — TRANSCRIPTION ENCOUNTER (OUTPATIENT)
Age: 38
End: 2023-11-07

## 2023-11-07 VITALS
TEMPERATURE: 98 F | SYSTOLIC BLOOD PRESSURE: 117 MMHG | DIASTOLIC BLOOD PRESSURE: 75 MMHG | HEART RATE: 65 BPM | OXYGEN SATURATION: 98 %

## 2023-11-07 LAB
CULTURE RESULTS: SIGNIFICANT CHANGE UP
GAMMA INTERFERON BACKGROUND BLD IA-ACNC: 0.03 IU/ML — SIGNIFICANT CHANGE UP
GAMMA INTERFERON BACKGROUND BLD IA-ACNC: 0.03 IU/ML — SIGNIFICANT CHANGE UP
GLUCOSE BLDC GLUCOMTR-MCNC: 125 MG/DL — HIGH (ref 70–99)
GLUCOSE BLDC GLUCOMTR-MCNC: 125 MG/DL — HIGH (ref 70–99)
GLUCOSE BLDC GLUCOMTR-MCNC: 83 MG/DL — SIGNIFICANT CHANGE UP (ref 70–99)
GLUCOSE BLDC GLUCOMTR-MCNC: 83 MG/DL — SIGNIFICANT CHANGE UP (ref 70–99)
M TB IFN-G BLD-IMP: NEGATIVE — SIGNIFICANT CHANGE UP
M TB IFN-G BLD-IMP: NEGATIVE — SIGNIFICANT CHANGE UP
M TB IFN-G CD4+ BCKGRND COR BLD-ACNC: -0.01 IU/ML — SIGNIFICANT CHANGE UP
M TB IFN-G CD4+ BCKGRND COR BLD-ACNC: -0.01 IU/ML — SIGNIFICANT CHANGE UP
M TB IFN-G CD4+CD8+ BCKGRND COR BLD-ACNC: 0.02 IU/ML — SIGNIFICANT CHANGE UP
M TB IFN-G CD4+CD8+ BCKGRND COR BLD-ACNC: 0.02 IU/ML — SIGNIFICANT CHANGE UP
NON-GYNECOLOGICAL CYTOLOGY STUDY: SIGNIFICANT CHANGE UP
NON-GYNECOLOGICAL CYTOLOGY STUDY: SIGNIFICANT CHANGE UP
QUANT TB PLUS MITOGEN MINUS NIL: 8.91 IU/ML — SIGNIFICANT CHANGE UP
QUANT TB PLUS MITOGEN MINUS NIL: 8.91 IU/ML — SIGNIFICANT CHANGE UP
SPECIMEN SOURCE: SIGNIFICANT CHANGE UP

## 2023-11-07 PROCEDURE — 87070 CULTURE OTHR SPECIMN AEROBIC: CPT

## 2023-11-07 PROCEDURE — 87206 SMEAR FLUORESCENT/ACID STAI: CPT

## 2023-11-07 PROCEDURE — 88173 CYTOPATH EVAL FNA REPORT: CPT

## 2023-11-07 PROCEDURE — 82962 GLUCOSE BLOOD TEST: CPT

## 2023-11-07 PROCEDURE — 87116 MYCOBACTERIA CULTURE: CPT

## 2023-11-07 PROCEDURE — 87040 BLOOD CULTURE FOR BACTERIA: CPT

## 2023-11-07 PROCEDURE — 71045 X-RAY EXAM CHEST 1 VIEW: CPT

## 2023-11-07 PROCEDURE — 96361 HYDRATE IV INFUSION ADD-ON: CPT

## 2023-11-07 PROCEDURE — 70490 CT SOFT TISSUE NECK W/O DYE: CPT

## 2023-11-07 PROCEDURE — 85025 COMPLETE CBC W/AUTO DIFF WBC: CPT

## 2023-11-07 PROCEDURE — 99285 EMERGENCY DEPT VISIT HI MDM: CPT

## 2023-11-07 PROCEDURE — 99232 SBSQ HOSP IP/OBS MODERATE 35: CPT

## 2023-11-07 PROCEDURE — 83735 ASSAY OF MAGNESIUM: CPT

## 2023-11-07 PROCEDURE — 76942 ECHO GUIDE FOR BIOPSY: CPT

## 2023-11-07 PROCEDURE — 85027 COMPLETE CBC AUTOMATED: CPT

## 2023-11-07 PROCEDURE — 96374 THER/PROPH/DIAG INJ IV PUSH: CPT

## 2023-11-07 PROCEDURE — 99239 HOSP IP/OBS DSCHRG MGMT >30: CPT | Mod: GC

## 2023-11-07 PROCEDURE — 87075 CULTR BACTERIA EXCEPT BLOOD: CPT

## 2023-11-07 PROCEDURE — 83036 HEMOGLOBIN GLYCOSYLATED A1C: CPT

## 2023-11-07 PROCEDURE — 86703 HIV-1/HIV-2 1 RESULT ANTBDY: CPT

## 2023-11-07 PROCEDURE — T1013: CPT

## 2023-11-07 PROCEDURE — 70491 CT SOFT TISSUE NECK W/DYE: CPT | Mod: MA

## 2023-11-07 PROCEDURE — 36415 COLL VENOUS BLD VENIPUNCTURE: CPT

## 2023-11-07 PROCEDURE — 80048 BASIC METABOLIC PNL TOTAL CA: CPT

## 2023-11-07 PROCEDURE — 86480 TB TEST CELL IMMUN MEASURE: CPT

## 2023-11-07 PROCEDURE — 87102 FUNGUS ISOLATION CULTURE: CPT

## 2023-11-07 PROCEDURE — 80053 COMPREHEN METABOLIC PANEL: CPT

## 2023-11-07 PROCEDURE — 87205 SMEAR GRAM STAIN: CPT

## 2023-11-07 PROCEDURE — 86140 C-REACTIVE PROTEIN: CPT

## 2023-11-07 PROCEDURE — 85652 RBC SED RATE AUTOMATED: CPT

## 2023-11-07 PROCEDURE — 87015 SPECIMEN INFECT AGNT CONCNTJ: CPT

## 2023-11-07 PROCEDURE — 71250 CT THORAX DX C-: CPT

## 2023-11-07 PROCEDURE — 88185 FLOWCYTOMETRY/TC ADD-ON: CPT

## 2023-11-07 PROCEDURE — 88184 FLOWCYTOMETRY/ TC 1 MARKER: CPT

## 2023-11-07 RX ORDER — INSULIN GLARGINE 100 [IU]/ML
12 INJECTION, SOLUTION SUBCUTANEOUS AT BEDTIME
Refills: 0 | Status: DISCONTINUED | OUTPATIENT
Start: 2023-11-07 | End: 2023-11-07

## 2023-11-07 RX ORDER — ISOPROPYL ALCOHOL, BENZOCAINE .7; .06 ML/ML; ML/ML
0 SWAB TOPICAL
Qty: 100 | Refills: 1
Start: 2023-11-07

## 2023-11-07 RX ADMIN — Medication 4 UNIT(S): at 11:58

## 2023-11-07 RX ADMIN — AMPICILLIN SODIUM AND SULBACTAM SODIUM 200 GRAM(S): 250; 125 INJECTION, POWDER, FOR SUSPENSION INTRAMUSCULAR; INTRAVENOUS at 06:04

## 2023-11-07 RX ADMIN — AMPICILLIN SODIUM AND SULBACTAM SODIUM 200 GRAM(S): 250; 125 INJECTION, POWDER, FOR SUSPENSION INTRAMUSCULAR; INTRAVENOUS at 11:56

## 2023-11-07 NOTE — PROGRESS NOTE ADULT - NS ATTEND AMEND GEN_ALL_CORE FT
In brief, 38 yr old male with neck mass and new onset T2DM.  glucoses improved, insulin doses as above. will continue basal/bolus insulin therapy as outpt.
in brief, 38 yr old male with neck mass and new onset T2DM.  glucoses improved, insulin doses as above. will continue basal/bolus insulin therapy as outpt
continue efforts to control blood sugar well due to infection

## 2023-11-07 NOTE — DISCHARGE NOTE NURSING/CASE MANAGEMENT/SOCIAL WORK - PATIENT PORTAL LINK FT
You can access the FollowMyHealth Patient Portal offered by [facility] by registering at the following website: [email or web address]. By joining [facility]’s FollowMyHealth portal, you will also be able to view your health information using other applications (apps) compatible with our system.

## 2023-11-07 NOTE — PROGRESS NOTE ADULT - ATTENDING COMMENTS
Patient seen and examined with examined with . Overall feels better. Left neck swelling persistent. AFebrile. Plan for follow up CT neck to assess need for further drainage
Patient seen and examined with residents, agree with the above assessment and plan. Medically stable for discharge home to follow up with ENT< endocrine and ID.
Patient seen and examined with [place] interptert, agree with the above assessment and plan.

## 2023-11-07 NOTE — PROGRESS NOTE ADULT - PROBLEM SELECTOR PLAN 1
-Improvement after drainage and abx  - emailed and office will call patient for followup next week   -% days of oral augmentin   -Pain control prn  -Diet as tolerated

## 2023-11-07 NOTE — PROGRESS NOTE ADULT - ASSESSMENT
38M with no PMHx presented to hospital because of several week history of enlarging neck mass, incidentally found to have diabetes. Endorses recent polydipsia and polyuria.    1. New onset diabetes, uncontrolled - a1c 12%  - Glucose 83 this morning, reduce lantus to 12 units qhs  - Continue admelog 4 units tid and sliding scale coverage  - Basal bolus therapy upon discharge with current doses, needs insulin/meter teaching  - Follow up appt: 11/27 @ 1:30pm ([place] location)    2. Neck Mass  - S/p IR needle aspiration  - On IV abx, to be discharged on oral abx  - ID and ENT following

## 2023-11-07 NOTE — PROGRESS NOTE ADULT - SUBJECTIVE AND OBJECTIVE BOX
ENT ISSUE/POD: L neck swelling s/p drainage w IR    HPI: Seen w medicine at bedside, Pt reports overall feeling improved ready to go home w new DM treatment. Has been afebrile and WBC has been normal       PAST MEDICAL & SURGICAL HISTORY:    Allergies    No Known Allergies    Intolerances      MEDICATIONS  (STANDING):  ampicillin/sulbactam  IVPB 3 Gram(s) IV Intermittent every 6 hours  ampicillin/sulbactam  IVPB      dextrose 5%. 1000 milliLiter(s) (100 mL/Hr) IV Continuous <Continuous>  dextrose 5%. 1000 milliLiter(s) (50 mL/Hr) IV Continuous <Continuous>  dextrose 50% Injectable 25 Gram(s) IV Push once  dextrose 50% Injectable 25 Gram(s) IV Push once  dextrose 50% Injectable 12.5 Gram(s) IV Push once  glucagon  Injectable 1 milliGRAM(s) IntraMuscular once  insulin glargine Injectable (LANTUS) 16 Unit(s) SubCutaneous at bedtime  insulin lispro (ADMELOG) corrective regimen sliding scale   SubCutaneous at bedtime  insulin lispro (ADMELOG) corrective regimen sliding scale   SubCutaneous three times a day before meals  insulin lispro Injectable (ADMELOG) 4 Unit(s) SubCutaneous three times a day before meals    MEDICATIONS  (PRN):  acetaminophen     Tablet .. 650 milliGRAM(s) Oral every 6 hours PRN Temp greater or equal to 38C (100.4F), Mild Pain (1 - 3)  aluminum hydroxide/magnesium hydroxide/simethicone Suspension 30 milliLiter(s) Oral every 4 hours PRN Dyspepsia  dextrose Oral Gel 15 Gram(s) Oral once PRN Blood Glucose LESS THAN 70 milliGRAM(s)/deciliter  melatonin 3 milliGRAM(s) Oral at bedtime PRN Insomnia  ondansetron Injectable 4 milliGRAM(s) IV Push every 8 hours PRN Nausea and/or Vomiting      ROS:   ENT: all negative except as noted in HPI   Pulm: denies SOB, cough, hemoptysis  Neuro: denies numbness/tingling, loss of sensation  Endo: denies heat/cold intolerance, excessive sweating      Vital Signs Last 24 Hrs  T(C): 36.3 (07 Nov 2023 04:34), Max: 37.1 (06 Nov 2023 12:21)  T(F): 97.3 (07 Nov 2023 04:34), Max: 98.8 (06 Nov 2023 17:27)  HR: 57 (07 Nov 2023 04:34) (57 - 72)  BP: 107/68 (07 Nov 2023 04:34) (102/67 - 118/64)  BP(mean): --  RR: 18 (07 Nov 2023 04:34) (18 - 18)  SpO2: 99% (07 Nov 2023 04:34) (96% - 99%)    Parameters below as of 07 Nov 2023 04:34  Patient On (Oxygen Delivery Method): room air                              13.8   7.37  )-----------( 287      ( 06 Nov 2023 06:26 )             41.4    11-06    139  |  102  |  13.6  ----------------------------<  84  3.9   |  25.0  |  0.60    Ca    9.0      06 Nov 2023 06:26         PHYSICAL EXAM:  Gen: NAD  Skin: No rashes, bruises, or lesions  Head: Normocephalic, Atraumatic  Face: no edema, erythema, or fluctuance. Parotid glands soft without mass  Eyes: no scleral injection  Nose: Nares bilaterally patent, no discharge  Mouth: No Stridor / Drooling / Trismus.  Mucosa moist, tongue/uvula midline, oropharynx clear  Neck: Slight firm L neck swelling, improving  Lymphatic: No lymphadenopathy  Resp: breathing easily, no stridor  Neuro: facial nerve intact, no facial droop

## 2023-11-07 NOTE — PROGRESS NOTE ADULT - SUBJECTIVE AND OBJECTIVE BOX
INTERVAL EVENTS:  Follow up diabetes management   #[id number]    ROS: Denies neck pain, abd pain, chest pain.     MEDICATIONS  (STANDING):  ampicillin/sulbactam  IVPB 3 Gram(s) IV Intermittent every 6 hours  ampicillin/sulbactam  IVPB      dextrose 5%. 1000 milliLiter(s) (100 mL/Hr) IV Continuous <Continuous>  dextrose 5%. 1000 milliLiter(s) (50 mL/Hr) IV Continuous <Continuous>  dextrose 50% Injectable 25 Gram(s) IV Push once  dextrose 50% Injectable 25 Gram(s) IV Push once  dextrose 50% Injectable 12.5 Gram(s) IV Push once  glucagon  Injectable 1 milliGRAM(s) IntraMuscular once  insulin glargine Injectable (LANTUS) 12 Unit(s) SubCutaneous at bedtime  insulin lispro (ADMELOG) corrective regimen sliding scale   SubCutaneous at bedtime  insulin lispro (ADMELOG) corrective regimen sliding scale   SubCutaneous three times a day before meals  insulin lispro Injectable (ADMELOG) 4 Unit(s) SubCutaneous three times a day before meals    MEDICATIONS  (PRN):  acetaminophen     Tablet .. 650 milliGRAM(s) Oral every 6 hours PRN Temp greater or equal to 38C (100.4F), Mild Pain (1 - 3)  aluminum hydroxide/magnesium hydroxide/simethicone Suspension 30 milliLiter(s) Oral every 4 hours PRN Dyspepsia  dextrose Oral Gel 15 Gram(s) Oral once PRN Blood Glucose LESS THAN 70 milliGRAM(s)/deciliter  melatonin 3 milliGRAM(s) Oral at bedtime PRN Insomnia  ondansetron Injectable 4 milliGRAM(s) IV Push every 8 hours PRN Nausea and/or Vomiting    Allergies  No Known Allergies    Vital Signs Last 24 Hrs  T(C): 36.3 (07 Nov 2023 04:34), Max: 37.1 (06 Nov 2023 12:21)  T(F): 97.3 (07 Nov 2023 04:34), Max: 98.8 (06 Nov 2023 17:27)  HR: 57 (07 Nov 2023 04:34) (57 - 72)  BP: 107/68 (07 Nov 2023 04:34) (102/67 - 118/64)  BP(mean): --  RR: 18 (07 Nov 2023 04:34) (18 - 18)  SpO2: 99% (07 Nov 2023 04:34) (96% - 99%)    Parameters below as of 07 Nov 2023 04:34  Patient On (Oxygen Delivery Method): room air    PHYSICAL EXAM:  General: No apparent distress  Neck: Improving left neck mass  Respiratory: Lungs clear bilaterally, normal rate, effort  Cardiac: +S1, S2, no m/r/g  GI: +BS, soft, non tender, non distended  Extremities: No peripheral edema, no pedal lesions  Neuro: A+O X3, no tremor    LABS:                        13.8   7.37  )-----------( 287      ( 06 Nov 2023 06:26 )             41.4     11-06    139  |  102  |  13.6  ----------------------------<  84  3.9   |  25.0  |  0.60    Ca    9.0      06 Nov 2023 06:26      Urinalysis Basic - ( 06 Nov 2023 06:26 )    Color: x / Appearance: x / SG: x / pH: x  Gluc: 84 mg/dL / Ketone: x  / Bili: x / Urobili: x   Blood: x / Protein: x / Nitrite: x   Leuk Esterase: x / RBC: x / WBC x   Sq Epi: x / Non Sq Epi: x / Bacteria: x    POCT Blood Glucose.: 83 mg/dL (11-07-23 @ 08:45)  POCT Blood Glucose.: 109 mg/dL (11-06-23 @ 21:36)  POCT Blood Glucose.: 104 mg/dL (11-06-23 @ 16:40)  POCT Blood Glucose.: 130 mg/dL (11-06-23 @ 12:45)         INTERVAL EVENTS:  Follow up diabetes management   #[id number]    ROS: Denies neck pain, abd pain, chest pain.     MEDICATIONS  (STANDING):  ampicillin/sulbactam  IVPB 3 Gram(s) IV Intermittent every 6 hours  ampicillin/sulbactam  IVPB      dextrose 5%. 1000 milliLiter(s) (100 mL/Hr) IV Continuous <Continuous>  dextrose 5%. 1000 milliLiter(s) (50 mL/Hr) IV Continuous <Continuous>  dextrose 50% Injectable 25 Gram(s) IV Push once  dextrose 50% Injectable 25 Gram(s) IV Push once  dextrose 50% Injectable 12.5 Gram(s) IV Push once  glucagon  Injectable 1 milliGRAM(s) IntraMuscular once  insulin glargine Injectable (LANTUS) 12 Unit(s) SubCutaneous at bedtime  insulin lispro (ADMELOG) corrective regimen sliding scale   SubCutaneous at bedtime  insulin lispro (ADMELOG) corrective regimen sliding scale   SubCutaneous three times a day before meals  insulin lispro Injectable (ADMELOG) 4 Unit(s) SubCutaneous three times a day before meals    MEDICATIONS  (PRN):  acetaminophen     Tablet .. 650 milliGRAM(s) Oral every 6 hours PRN Temp greater or equal to 38C (100.4F), Mild Pain (1 - 3)  aluminum hydroxide/magnesium hydroxide/simethicone Suspension 30 milliLiter(s) Oral every 4 hours PRN Dyspepsia  dextrose Oral Gel 15 Gram(s) Oral once PRN Blood Glucose LESS THAN 70 milliGRAM(s)/deciliter  melatonin 3 milliGRAM(s) Oral at bedtime PRN Insomnia  ondansetron Injectable 4 milliGRAM(s) IV Push every 8 hours PRN Nausea and/or Vomiting    Allergies  No Known Allergies    Vital Signs Last 24 Hrs  T(C): 36.3 (07 Nov 2023 04:34), Max: 37.1 (06 Nov 2023 12:21)  T(F): 97.3 (07 Nov 2023 04:34), Max: 98.8 (06 Nov 2023 17:27)  HR: 57 (07 Nov 2023 04:34) (57 - 72)  BP: 107/68 (07 Nov 2023 04:34) (102/67 - 118/64)  BP(mean): --  RR: 18 (07 Nov 2023 04:34) (18 - 18)  SpO2: 99% (07 Nov 2023 04:34) (96% - 99%)    Parameters below as of 07 Nov 2023 04:34  Patient On (Oxygen Delivery Method): room air    PHYSICAL EXAM:  General: No apparent distress  Neck: Improving left neck mass, firm and nontender  Respiratory: Lungs clear bilaterally, normal rate, effort  Cardiac: +S1, S2, no m/r/g  GI: +BS, soft, non tender, non distended  Extremities: No peripheral edema, no pedal lesions  Neuro: A+O X3, no tremor    LABS:                        13.8   7.37  )-----------( 287      ( 06 Nov 2023 06:26 )             41.4     11-06    139  |  102  |  13.6  ----------------------------<  84  3.9   |  25.0  |  0.60    Ca    9.0      06 Nov 2023 06:26      Urinalysis Basic - ( 06 Nov 2023 06:26 )    Color: x / Appearance: x / SG: x / pH: x  Gluc: 84 mg/dL / Ketone: x  / Bili: x / Urobili: x   Blood: x / Protein: x / Nitrite: x   Leuk Esterase: x / RBC: x / WBC x   Sq Epi: x / Non Sq Epi: x / Bacteria: x    POCT Blood Glucose.: 83 mg/dL (11-07-23 @ 08:45)  POCT Blood Glucose.: 109 mg/dL (11-06-23 @ 21:36)  POCT Blood Glucose.: 104 mg/dL (11-06-23 @ 16:40)  POCT Blood Glucose.: 130 mg/dL (11-06-23 @ 12:45)

## 2023-11-07 NOTE — DISCHARGE NOTE NURSING/CASE MANAGEMENT/SOCIAL WORK - NSDCPEFALRISK_GEN_ALL_CORE
For information on Fall & Injury Prevention, visit: [email or web address] OR  [email or web address] OR  https://www.cdc.gov/steadi/patient.html

## 2023-11-07 NOTE — PROGRESS NOTE ADULT - SUBJECTIVE AND OBJECTIVE BOX
SUBJECTIVE:    Chief Complaint: Patient is a 38y old  Male who presents with a chief complaint of L sided neck swelling (06 Nov 2023 16:37)    INTERVAL HPI/OVERNIGHT EVENTS: Patient seen and examined at bedside at AM. No clinically acute event overnight.  Denies any chest pain, palpitations, SOB, leg edema, N/V/D, or fevers.    MEDICATIONS  (STANDING):  ampicillin/sulbactam  IVPB 3 Gram(s) IV Intermittent every 6 hours  ampicillin/sulbactam  IVPB      dextrose 5%. 1000 milliLiter(s) (100 mL/Hr) IV Continuous <Continuous>  dextrose 5%. 1000 milliLiter(s) (50 mL/Hr) IV Continuous <Continuous>  dextrose 50% Injectable 25 Gram(s) IV Push once  dextrose 50% Injectable 25 Gram(s) IV Push once  dextrose 50% Injectable 12.5 Gram(s) IV Push once  glucagon  Injectable 1 milliGRAM(s) IntraMuscular once  insulin glargine Injectable (LANTUS) 16 Unit(s) SubCutaneous at bedtime  insulin lispro (ADMELOG) corrective regimen sliding scale   SubCutaneous at bedtime  insulin lispro (ADMELOG) corrective regimen sliding scale   SubCutaneous three times a day before meals  insulin lispro Injectable (ADMELOG) 4 Unit(s) SubCutaneous three times a day before meals    MEDICATIONS  (PRN):  acetaminophen     Tablet .. 650 milliGRAM(s) Oral every 6 hours PRN Temp greater or equal to 38C (100.4F), Mild Pain (1 - 3)  aluminum hydroxide/magnesium hydroxide/simethicone Suspension 30 milliLiter(s) Oral every 4 hours PRN Dyspepsia  dextrose Oral Gel 15 Gram(s) Oral once PRN Blood Glucose LESS THAN 70 milliGRAM(s)/deciliter  melatonin 3 milliGRAM(s) Oral at bedtime PRN Insomnia  ondansetron Injectable 4 milliGRAM(s) IV Push every 8 hours PRN Nausea and/or Vomiting      Allergies  No Known Allergies      REVIEW OF SYSTEMS:  All other review of systems negative, except as noted in HPI    OBJECTIVE:    Vital Signs Last 24 Hrs  T(C): 36.3 (07 Nov 2023 04:34), Max: 37.1 (06 Nov 2023 12:21)  T(F): 97.3 (07 Nov 2023 04:34), Max: 98.8 (06 Nov 2023 17:27)  HR: 57 (07 Nov 2023 04:34) (57 - 72)  BP: 107/68 (07 Nov 2023 04:34) (102/67 - 118/64)  BP(mean): --  RR: 18 (07 Nov 2023 04:34) (18 - 18)  SpO2: 99% (07 Nov 2023 04:34) (96% - 99%)    Parameters below as of 07 Nov 2023 04:34  Patient On (Oxygen Delivery Method): room air    PHYSICAL EXAM:  GENERAL: NAD, lying in bed comfortably  HEAD:  Atraumatic, Normocephalic  EYES: EOMI, PERRLA, conjunctiva and sclera clear  ENT: Moist mucous membranes  NECK: Left neck swelling (+), no surrounding erythema or tenderness Supple, No JVD.  CHEST/LUNG: Clear to auscultation bilaterally; No rales, rhonchi, wheezing, or rubs. Unlabored respirations  HEART: Regular rate and rhythm; No murmurs, rubs, or gallops  ABDOMEN: BSx4; Soft, nontender, nondistended  EXTREMITIES:  2+ Peripheral Pulses, brisk capillary refill. No clubbing, cyanosis, or edema  NERVOUS SYSTEM:  A&Ox3, no focal deficits   SKIN: No rashes or lesions  Lab/ Imaging:    LABS:                        13.8   7.37  )-----------( 287      ( 06 Nov 2023 06:26 )             41.4     11-06    139  |  102  |  13.6  ----------------------------<  84  3.9   |  25.0  |  0.60    Ca    9.0      06 Nov 2023 06:26    Urinalysis Basic - ( 06 Nov 2023 06:26 )  Color: x / Appearance: x / SG: x / pH: x  Gluc: 84 mg/dL / Ketone: x  / Bili: x / Urobili: x   Blood: x / Protein: x / Nitrite: x   Leuk Esterase: x / RBC: x / WBC x   Sq Epi: x / Non Sq Epi: x / Bacteria: x    CAPILLARY BLOOD GLUCOSE  POCT Blood Glucose.: 109 mg/dL (06 Nov 2023 21:36)  POCT Blood Glucose.: 104 mg/dL (06 Nov 2023 16:40)  POCT Blood Glucose.: 130 mg/dL (06 Nov 2023 12:45)  POCT Blood Glucose.: 75 mg/dL (06 Nov 2023 08:48)   Epidermal Sutures: none, closed by secondary intention

## 2023-11-07 NOTE — CHART NOTE - NSCHARTNOTEFT_GEN_A_CORE
Nutrition Note:   Provided [place] copy of meal planning with plate model for diabetes education.  Encouraged compliance and to limit sweets and increase fiber for improved glucose control.  Pt needs to follow up as out pt for diabetes management.   Pt to be discharged today.

## 2023-11-07 NOTE — PROGRESS NOTE ADULT - ASSESSMENT
37 yo male w L neck swelling s/p drainage of purulence pending cultures with improvement, stable for DC

## 2023-11-07 NOTE — PROGRESS NOTE ADULT - ASSESSMENT
38 year old male with no known past medical history who presented to the ER with complaints of increasing left sided neck mass x 1 week. In the ER, CT of the neck showed a  5.0 cm cystic-solid mass identified in the deep left neck with regional inflammatory changes. Started on Empiric IV Unasyn. ENT was consulted for evaluation. Status post IR guided biopsy with purulent fluid sent for culture. Blood cultures negative x 2. Incidentally noted to be hyperglycemic with a Hbaic of 12. Seen by Endocrine and diabetes educator. Started on Lantus and ADMELOG pre-meal. CT chest negative for TB; QuantiFeron negative- TB ruled out.  pending culture results    Assessment/Plan:    1. Left neck mass suspected abscess  - Purulent fluid sent for culture by IR; cultures thus far negative. Pending fungal culture.  - Blood cultures negative x 2  - Cytopath and flow cytometry sent, reports pending   - ENT and ID following, recs appreciated  -  IV Unasyn day 6, will transition to Augmentin for 5 more days on discharge.  - Low suspicion for TB- CT chest negative; QuantiFeron negative, precautions to be discharged  - Rpt CT neck (11/05)- No internal change to left neck mass. No new collection/adenopathy.  - IR reconsulted to review images, no appreciable fluid pockets noted for drainage. Recommended Out-patient follow-up     2. New diagnosis of Diabetes mellitus   - Hbaic of 12  - Lantus 16 units QHS  - Ademlog 4 units TID with meals  - BSL improved  - Continue diabetes education and insulin administration   - spoke with patient at bedside the need for diabetes follow up after discharge, continuation with insulin until switched to PO medications outpatient, and fingerstick education    VTE- SCDS  Diet: DASH/TLC  Discharge disposition: Home tomorrow after Diabetic education. Pending final culture results.    38 year old male with no known past medical history who presented to the ER with complaints of increasing left sided neck mass x 1 week. In the ER, CT of the neck showed a  5.0 cm cystic-solid mass identified in the deep left neck with regional inflammatory changes. Started on Empiric IV Unasyn. ENT was consulted for evaluation. Status post IR guided biopsy with purulent fluid sent for culture. Blood cultures negative x 2. Incidentally noted to be hyperglycemic with a Hbaic of 12. Seen by Endocrine and diabetes educator. Started on Lantus and ADMELOG pre-meal. CT chest negative for TB; QuantiFeron negative- TB ruled out.  All cultures thus far negative     Assessment/Plan:    1. Left neck mass suspected abscess  - Purulent fluid sent for culture by IR; cultures thus far negative  - Blood cultures negative x 2  - Cytopath and flow cytometry sent, reports pending   - To follow up with ID and ENT as outpatient   -  IV Unasyn day 6, will transition to Augmentin to complete a total of 14 days of Antibiotics   - Low suspicion for TB- CT chest negative; QuantiFeron negative, precautions to be discharged  - Rpt CT neck (11/05)- No internal change to left neck mass. No new collection/adenopathy.  - IR reconsulted to review images, no appreciable fluid pockets noted for drainage. Recommended Out-patient follow-up     2. New diagnosis of Diabetes mellitus   - Hbaic of 12  - Lantus 16 units QHS  - Ademlog 4 units TID with meals  - BSL improved  - Self administrating and testing BSL appropriately  - To follow up with Endocrine on discharge  - spoke with patient at bedside the need for diabetes follow up after discharge, continuation with insulin until switched to PO medications outpatient, and fingerstick education    VTE- SCDS  Diet: DASH/TLC  Discharge disposition: Medically stable for discharge home    38 year old male with no known past medical history who presented to the ER with complaints of increasing left sided neck mass x 1 week. In the ER, CT of the neck showed a  5.0 cm cystic-solid mass identified in the deep left neck with regional inflammatory changes. Started on Empiric IV Unasyn. ENT was consulted for evaluation. Status post IR guided biopsy with purulent fluid sent for culture. Blood cultures negative x 2. Incidentally noted to be hyperglycemic with a Hbaic of 12. Seen by Endocrine and diabetes educator. Started on Lantus and ADMELOG pre-meal. CT chest negative for TB; QuantiFeron negative- TB ruled out.  All cultures thus far negative     Assessment/Plan:    1. Left neck mass suspected abscess  - Purulent fluid sent for culture by IR; cultures thus far negative  - Blood cultures negative x 2  - Cytopath and flow cytometry sent, reports pending   - To follow up with ID and ENT as outpatient   -  IV Unasyn day 6, will transition to Augmentin to complete a total of 14 days of Antibiotics   - Low suspicion for TB- CT chest negative; QuantiFeron negative, precautions to be discharged  - Rpt CT neck (11/05)- No internal change to left neck mass. No new collection/adenopathy.  - IR reconsulted to review images, no appreciable fluid pockets noted for drainage. Recommended Out-patient follow-up with ENT & ID    2. New diagnosis of Diabetes mellitus   - Hbaic of 12  - Lantus 16 units QHS  - Ademlog 4 units TID with meals  - BSL improved  - Self administrating and testing BSL appropriately  - To follow up with Endocrine on discharge  - spoke with patient at bedside the need for diabetes follow up after discharge, continuation with insulin until switched to PO medications outpatient, and fingerstick education    VTE- SCDS  Diet: DASH/TLC  Discharge disposition: Medically stable for discharge home

## 2023-11-08 LAB
CULTURE RESULTS: SIGNIFICANT CHANGE UP
CULTURE RESULTS: SIGNIFICANT CHANGE UP
SPECIMEN SOURCE: SIGNIFICANT CHANGE UP
SPECIMEN SOURCE: SIGNIFICANT CHANGE UP

## 2023-11-14 LAB
TM INTERPRETATION: SIGNIFICANT CHANGE UP
TM INTERPRETATION: SIGNIFICANT CHANGE UP

## 2023-11-15 ENCOUNTER — APPOINTMENT (OUTPATIENT)
Dept: OTOLARYNGOLOGY | Facility: CLINIC | Age: 38
End: 2023-11-15
Payer: MEDICAID

## 2023-11-15 VITALS
DIASTOLIC BLOOD PRESSURE: 82 MMHG | HEIGHT: 63.39 IN | BODY MASS INDEX: 28.17 KG/M2 | SYSTOLIC BLOOD PRESSURE: 128 MMHG | HEART RATE: 67 BPM | WEIGHT: 161 LBS

## 2023-11-15 DIAGNOSIS — Z87.898 PERSONAL HISTORY OF OTHER SPECIFIED CONDITIONS: ICD-10-CM

## 2023-11-15 DIAGNOSIS — L02.11 CUTANEOUS ABSCESS OF NECK: ICD-10-CM

## 2023-11-15 PROCEDURE — 99203 OFFICE O/P NEW LOW 30 MIN: CPT

## 2023-11-22 LAB — HBA1C MFR BLD HPLC: 12

## 2023-11-27 ENCOUNTER — APPOINTMENT (OUTPATIENT)
Dept: ENDOCRINOLOGY | Facility: CLINIC | Age: 38
End: 2023-11-27
Payer: MEDICAID

## 2023-11-27 VITALS
BODY MASS INDEX: 28.35 KG/M2 | HEART RATE: 72 BPM | WEIGHT: 160 LBS | DIASTOLIC BLOOD PRESSURE: 78 MMHG | SYSTOLIC BLOOD PRESSURE: 128 MMHG | HEIGHT: 63 IN

## 2023-11-27 DIAGNOSIS — Z72.3 LACK OF PHYSICAL EXERCISE: ICD-10-CM

## 2023-11-27 DIAGNOSIS — E11.9 TYPE 2 DIABETES MELLITUS W/OUT COMPLICATIONS: ICD-10-CM

## 2023-11-27 DIAGNOSIS — Z86.39 PERSONAL HISTORY OF OTHER ENDOCRINE, NUTRITIONAL AND METABOLIC DISEASE: ICD-10-CM

## 2023-11-27 DIAGNOSIS — Z79.4 TYPE 2 DIABETES MELLITUS W/OUT COMPLICATIONS: ICD-10-CM

## 2023-11-27 LAB — GLUCOSE BLDC GLUCOMTR-MCNC: 141

## 2023-11-27 PROCEDURE — 82962 GLUCOSE BLOOD TEST: CPT

## 2023-11-27 PROCEDURE — 99214 OFFICE O/P EST MOD 30 MIN: CPT | Mod: 25

## 2023-11-27 RX ORDER — BLOOD-GLUCOSE METER
W/DEVICE KIT MISCELLANEOUS
Qty: 1 | Refills: 0 | Status: ACTIVE | COMMUNITY
Start: 2023-11-27

## 2023-11-27 RX ORDER — ALCOHOL ANTISEPTIC PADS
70 PADS, MEDICATED (EA) TOPICAL
Qty: 400 | Refills: 1 | Status: ACTIVE | COMMUNITY
Start: 2023-11-27 | End: 1900-01-01

## 2023-11-27 RX ORDER — INSULIN LISPRO 100 U/ML
100 INJECTION, SOLUTION SUBCUTANEOUS
Refills: 0 | Status: DISCONTINUED | COMMUNITY
End: 2023-11-27

## 2023-11-27 RX ORDER — INSULIN GLARGINE 100 [IU]/ML
100 INJECTION, SOLUTION SUBCUTANEOUS
Refills: 0 | Status: ACTIVE | COMMUNITY

## 2023-11-27 RX ORDER — BLOOD SUGAR DIAGNOSTIC
STRIP MISCELLANEOUS 4 TIMES DAILY
Qty: 4 | Refills: 1 | Status: ACTIVE | COMMUNITY
Start: 2023-11-27 | End: 1900-01-01

## 2023-11-27 RX ORDER — AMOXICILLIN AND CLAVULANATE POTASSIUM 875; 125 MG/1; MG/1
875-125 TABLET, COATED ORAL
Qty: 20 | Refills: 0 | Status: DISCONTINUED | COMMUNITY
Start: 2023-11-15 | End: 2023-11-27

## 2023-11-27 RX ORDER — LANCETS 28 GAUGE
EACH MISCELLANEOUS
Qty: 400 | Refills: 1 | Status: ACTIVE | COMMUNITY
Start: 2023-11-27 | End: 1900-01-01

## 2023-12-01 RX ORDER — INSULIN LISPRO 100 [IU]/ML
100 INJECTION, SOLUTION INTRAVENOUS; SUBCUTANEOUS
Qty: 2 | Refills: 1 | Status: ACTIVE | COMMUNITY
Start: 2023-11-27 | End: 1900-01-01

## 2023-12-07 ENCOUNTER — APPOINTMENT (OUTPATIENT)
Dept: ENDOCRINOLOGY | Facility: CLINIC | Age: 38
End: 2023-12-07

## 2023-12-20 LAB
CULTURE RESULTS: SIGNIFICANT CHANGE UP
SPECIMEN SOURCE: SIGNIFICANT CHANGE UP

## 2024-01-01 NOTE — ED PROVIDER NOTE - CLINICAL SUMMARY MEDICAL DECISION MAKING FREE TEXT BOX
Patient with left lateral neck mass x1 week physical exam as documented patient requires lab work and CAT scan imaging to further delineate characteristics of the mass and determine whether or not this is a benign or malignant process.  Of note is that patient is a non-smoker favoring a more benign diagnosis
Statement Selected

## 2024-01-08 ENCOUNTER — APPOINTMENT (OUTPATIENT)
Dept: ENDOCRINOLOGY | Facility: CLINIC | Age: 39
End: 2024-01-08

## 2024-03-12 NOTE — DISCHARGE NOTE PROVIDER - NSDCCPCAREPLAN_GEN_ALL_CORE_FT
[facility] PRINCIPAL DISCHARGE DIAGNOSIS  Diagnosis: Neck mass  Assessment and Plan of Treatment: - Continue oral Antibiotic (Augmentin) for 8 days  - Follow-up with ENT in 1 week      SECONDARY DISCHARGE DIAGNOSES  Diagnosis: Diabetes mellitus, new onset  Assessment and Plan of Treatment: - Continue insulin  - Monitor blood sugar levels 3 times a day  - Follow-up with Endocrinology     PRINCIPAL DISCHARGE DIAGNOSIS  Diagnosis: Neck mass  Assessment and Plan of Treatment: - Continue oral Antibiotic (Augmentin) for 8 days  - Follow-up with ENT in 1 week      SECONDARY DISCHARGE DIAGNOSES  Diagnosis: Diabetes mellitus, new onset  Assessment and Plan of Treatment: Please check your blood sugar levels 4 times a day- 3 times a day before meals and once at bedtime. Do not take insulin before meals if you will not be eating. Please call your endocrinologist if you notice blood sugar levels less than 70 or more than 250. If your blood sugar levels are less than 70, drink some juice and recheck your sugar levels in 15 minutes.  Give yourself insulin exactly as prescribed. If you are skipping a meal, do not give yourself insulin beforehand.   It is important that you follow up with your primary care doctor or endocrinologist to have your Hemoglobin A1c levels checked every 3 months. You will also need to have yearly eye exams as well as foot exams

## 2024-03-19 ENCOUNTER — APPOINTMENT (OUTPATIENT)
Dept: ENDOCRINOLOGY | Facility: CLINIC | Age: 39
End: 2024-03-19